# Patient Record
Sex: MALE | Race: WHITE | NOT HISPANIC OR LATINO | Employment: UNEMPLOYED | URBAN - METROPOLITAN AREA
[De-identification: names, ages, dates, MRNs, and addresses within clinical notes are randomized per-mention and may not be internally consistent; named-entity substitution may affect disease eponyms.]

---

## 2022-08-13 ENCOUNTER — HOSPITAL ENCOUNTER (INPATIENT)
Facility: HOSPITAL | Age: 57
LOS: 4 days | Discharge: HOME | End: 2022-08-17
Attending: INTERNAL MEDICINE | Admitting: INTERNAL MEDICINE

## 2022-08-13 DIAGNOSIS — R91.8 LUNG MASS: ICD-10-CM

## 2022-08-13 DIAGNOSIS — N28.89 RENAL MASS: Primary | ICD-10-CM

## 2022-08-13 PROBLEM — F12.90 MARIJUANA USE: Status: ACTIVE | Noted: 2022-08-13

## 2022-08-13 PROBLEM — F17.200 TOBACCO DEPENDENCE: Status: ACTIVE | Noted: 2022-08-13

## 2022-08-13 PROBLEM — I10 HIGH BLOOD PRESSURE: Status: ACTIVE | Noted: 2022-08-13

## 2022-08-13 PROBLEM — I87.1 SVC SYNDROME: Status: ACTIVE | Noted: 2022-08-13

## 2022-08-13 PROBLEM — Z78.9 ALCOHOL USE: Status: ACTIVE | Noted: 2022-08-13

## 2022-08-13 LAB
ALBUMIN SERPL-MCNC: 3.6 G/DL (ref 3.5–5.2)
ALBUMIN/GLOB SERPL: 0.9 G/DL
ALP SERPL-CCNC: 73 U/L (ref 39–117)
ALT SERPL W P-5'-P-CCNC: 6 U/L (ref 1–41)
ANION GAP SERPL CALCULATED.3IONS-SCNC: 9 MMOL/L (ref 5–15)
AST SERPL-CCNC: 9 U/L (ref 1–40)
BASOPHILS # BLD AUTO: 0.02 10*3/MM3 (ref 0–0.2)
BASOPHILS NFR BLD AUTO: 0.3 % (ref 0–1.5)
BILIRUB SERPL-MCNC: 0.3 MG/DL (ref 0–1.2)
BUN SERPL-MCNC: 5 MG/DL (ref 6–20)
BUN/CREAT SERPL: 8.5 (ref 7–25)
CALCIUM SPEC-SCNC: 9.9 MG/DL (ref 8.6–10.5)
CHLORIDE SERPL-SCNC: 104 MMOL/L (ref 98–107)
CO2 SERPL-SCNC: 26 MMOL/L (ref 22–29)
CREAT SERPL-MCNC: 0.59 MG/DL (ref 0.76–1.27)
D-LACTATE SERPL-SCNC: 0.9 MMOL/L (ref 0.5–2)
DEPRECATED RDW RBC AUTO: 43.2 FL (ref 37–54)
EGFRCR SERPLBLD CKD-EPI 2021: 113.2 ML/MIN/1.73
EOSINOPHIL # BLD AUTO: 0.08 10*3/MM3 (ref 0–0.4)
EOSINOPHIL NFR BLD AUTO: 1.3 % (ref 0.3–6.2)
ERYTHROCYTE [DISTWIDTH] IN BLOOD BY AUTOMATED COUNT: 12.1 % (ref 12.3–15.4)
GLOBULIN UR ELPH-MCNC: 3.9 GM/DL
GLUCOSE SERPL-MCNC: 91 MG/DL (ref 65–99)
HCT VFR BLD AUTO: 40.4 % (ref 37.5–51)
HGB BLD-MCNC: 13.5 G/DL (ref 13–17.7)
IMM GRANULOCYTES # BLD AUTO: 0.01 10*3/MM3 (ref 0–0.05)
IMM GRANULOCYTES NFR BLD AUTO: 0.2 % (ref 0–0.5)
INR PPP: 1 (ref 0.84–1.13)
LYMPHOCYTES # BLD AUTO: 1.53 10*3/MM3 (ref 0.7–3.1)
LYMPHOCYTES NFR BLD AUTO: 24.6 % (ref 19.6–45.3)
MCH RBC QN AUTO: 32.4 PG (ref 26.6–33)
MCHC RBC AUTO-ENTMCNC: 33.4 G/DL (ref 31.5–35.7)
MCV RBC AUTO: 96.9 FL (ref 79–97)
MONOCYTES # BLD AUTO: 0.65 10*3/MM3 (ref 0.1–0.9)
MONOCYTES NFR BLD AUTO: 10.4 % (ref 5–12)
NEUTROPHILS NFR BLD AUTO: 3.94 10*3/MM3 (ref 1.7–7)
NEUTROPHILS NFR BLD AUTO: 63.2 % (ref 42.7–76)
NRBC BLD AUTO-RTO: 0 /100 WBC (ref 0–0.2)
PLATELET # BLD AUTO: 178 10*3/MM3 (ref 140–450)
PMV BLD AUTO: 10.3 FL (ref 6–12)
POTASSIUM SERPL-SCNC: 3.8 MMOL/L (ref 3.5–5.2)
PROT SERPL-MCNC: 7.5 G/DL (ref 6–8.5)
PROTHROMBIN TIME: 13.1 SECONDS (ref 11.4–14.4)
RBC # BLD AUTO: 4.17 10*6/MM3 (ref 4.14–5.8)
SODIUM SERPL-SCNC: 139 MMOL/L (ref 136–145)
WBC NRBC COR # BLD: 6.23 10*3/MM3 (ref 3.4–10.8)

## 2022-08-13 PROCEDURE — 85610 PROTHROMBIN TIME: CPT | Performed by: INTERNAL MEDICINE

## 2022-08-13 PROCEDURE — 99223 1ST HOSP IP/OBS HIGH 75: CPT | Performed by: INTERNAL MEDICINE

## 2022-08-13 PROCEDURE — 83605 ASSAY OF LACTIC ACID: CPT | Performed by: INTERNAL MEDICINE

## 2022-08-13 PROCEDURE — 84145 PROCALCITONIN (PCT): CPT | Performed by: INTERNAL MEDICINE

## 2022-08-13 PROCEDURE — 0202U NFCT DS 22 TRGT SARS-COV-2: CPT | Performed by: INTERNAL MEDICINE

## 2022-08-13 PROCEDURE — 86901 BLOOD TYPING SEROLOGIC RH(D): CPT | Performed by: INTERNAL MEDICINE

## 2022-08-13 PROCEDURE — 80053 COMPREHEN METABOLIC PANEL: CPT | Performed by: INTERNAL MEDICINE

## 2022-08-13 PROCEDURE — 86850 RBC ANTIBODY SCREEN: CPT | Performed by: INTERNAL MEDICINE

## 2022-08-13 PROCEDURE — 87040 BLOOD CULTURE FOR BACTERIA: CPT | Performed by: INTERNAL MEDICINE

## 2022-08-13 PROCEDURE — 85025 COMPLETE CBC W/AUTO DIFF WBC: CPT | Performed by: INTERNAL MEDICINE

## 2022-08-13 PROCEDURE — 86900 BLOOD TYPING SEROLOGIC ABO: CPT | Performed by: INTERNAL MEDICINE

## 2022-08-13 RX ORDER — ACETAMINOPHEN 325 MG/1
650 TABLET ORAL EVERY 4 HOURS PRN
Status: DISCONTINUED | OUTPATIENT
Start: 2022-08-13 | End: 2022-08-17 | Stop reason: HOSPADM

## 2022-08-13 RX ORDER — DIAZEPAM 5 MG/ML
5 INJECTION, SOLUTION INTRAMUSCULAR; INTRAVENOUS
Status: DISCONTINUED | OUTPATIENT
Start: 2022-08-13 | End: 2022-08-17 | Stop reason: HOSPADM

## 2022-08-13 RX ORDER — SODIUM CHLORIDE 0.9 % (FLUSH) 0.9 %
10 SYRINGE (ML) INJECTION EVERY 12 HOURS SCHEDULED
Status: DISCONTINUED | OUTPATIENT
Start: 2022-08-13 | End: 2022-08-17 | Stop reason: HOSPADM

## 2022-08-13 RX ORDER — LORAZEPAM 0.5 MG/1
0.5 TABLET ORAL ONCE
Status: COMPLETED | OUTPATIENT
Start: 2022-08-14 | End: 2022-08-14

## 2022-08-13 RX ORDER — HEPARIN SODIUM 1000 [USP'U]/ML
25 INJECTION, SOLUTION INTRAVENOUS; SUBCUTANEOUS AS NEEDED
Status: DISCONTINUED | OUTPATIENT
Start: 2022-08-13 | End: 2022-08-13 | Stop reason: SDUPTHER

## 2022-08-13 RX ORDER — ONDANSETRON 2 MG/ML
4 INJECTION INTRAMUSCULAR; INTRAVENOUS EVERY 6 HOURS PRN
Status: DISCONTINUED | OUTPATIENT
Start: 2022-08-13 | End: 2022-08-17 | Stop reason: HOSPADM

## 2022-08-13 RX ORDER — HEPARIN SODIUM 1000 [USP'U]/ML
50 INJECTION, SOLUTION INTRAVENOUS; SUBCUTANEOUS AS NEEDED
Status: DISCONTINUED | OUTPATIENT
Start: 2022-08-13 | End: 2022-08-13 | Stop reason: SDUPTHER

## 2022-08-13 RX ORDER — NICOTINE 21 MG/24HR
1 PATCH, TRANSDERMAL 24 HOURS TRANSDERMAL EVERY 24 HOURS
Status: DISCONTINUED | OUTPATIENT
Start: 2022-08-13 | End: 2022-08-17 | Stop reason: HOSPADM

## 2022-08-13 RX ORDER — DIAZEPAM 5 MG/ML
10 INJECTION, SOLUTION INTRAMUSCULAR; INTRAVENOUS
Status: DISCONTINUED | OUTPATIENT
Start: 2022-08-13 | End: 2022-08-17 | Stop reason: HOSPADM

## 2022-08-13 RX ORDER — FOLIC ACID 1 MG/1
1 TABLET ORAL DAILY
Status: COMPLETED | OUTPATIENT
Start: 2022-08-14 | End: 2022-08-16

## 2022-08-13 RX ORDER — ACETAMINOPHEN 650 MG/1
650 SUPPOSITORY RECTAL EVERY 4 HOURS PRN
Status: DISCONTINUED | OUTPATIENT
Start: 2022-08-13 | End: 2022-08-17 | Stop reason: HOSPADM

## 2022-08-13 RX ORDER — PANTOPRAZOLE SODIUM 40 MG/1
40 TABLET, DELAYED RELEASE ORAL
Status: DISCONTINUED | OUTPATIENT
Start: 2022-08-14 | End: 2022-08-17 | Stop reason: HOSPADM

## 2022-08-13 RX ORDER — LORAZEPAM 0.5 MG/1
0.5 TABLET ORAL EVERY 6 HOURS PRN
Status: DISCONTINUED | OUTPATIENT
Start: 2022-08-13 | End: 2022-08-13 | Stop reason: SDUPTHER

## 2022-08-13 RX ORDER — SODIUM CHLORIDE 0.9 % (FLUSH) 0.9 %
10 SYRINGE (ML) INJECTION AS NEEDED
Status: DISCONTINUED | OUTPATIENT
Start: 2022-08-13 | End: 2022-08-17 | Stop reason: HOSPADM

## 2022-08-13 RX ORDER — MORPHINE SULFATE 2 MG/ML
2 INJECTION, SOLUTION INTRAMUSCULAR; INTRAVENOUS EVERY 4 HOURS PRN
Status: DISCONTINUED | OUTPATIENT
Start: 2022-08-13 | End: 2022-08-17 | Stop reason: HOSPADM

## 2022-08-13 RX ORDER — LORAZEPAM 1 MG/1
1 TABLET ORAL
Status: DISCONTINUED | OUTPATIENT
Start: 2022-08-13 | End: 2022-08-17 | Stop reason: HOSPADM

## 2022-08-13 RX ORDER — ACETAMINOPHEN 160 MG/5ML
650 SOLUTION ORAL EVERY 4 HOURS PRN
Status: DISCONTINUED | OUTPATIENT
Start: 2022-08-13 | End: 2022-08-17 | Stop reason: HOSPADM

## 2022-08-13 RX ORDER — HEPARIN SODIUM 10000 [USP'U]/100ML
28 INJECTION, SOLUTION INTRAVENOUS
Status: DISCONTINUED | OUTPATIENT
Start: 2022-08-14 | End: 2022-08-17

## 2022-08-13 RX ORDER — DIPHENOXYLATE HYDROCHLORIDE AND ATROPINE SULFATE 2.5; .025 MG/1; MG/1
1 TABLET ORAL DAILY
Status: COMPLETED | OUTPATIENT
Start: 2022-08-14 | End: 2022-08-16

## 2022-08-13 RX ORDER — THIAMINE HYDROCHLORIDE 100 MG/ML
100 INJECTION, SOLUTION INTRAMUSCULAR; INTRAVENOUS ONCE
Status: COMPLETED | OUTPATIENT
Start: 2022-08-14 | End: 2022-08-14

## 2022-08-13 RX ORDER — LORAZEPAM 1 MG/1
2 TABLET ORAL
Status: DISCONTINUED | OUTPATIENT
Start: 2022-08-13 | End: 2022-08-17 | Stop reason: HOSPADM

## 2022-08-13 RX ADMIN — Medication 10 ML: at 23:31

## 2022-08-13 RX ADMIN — HEPARIN SODIUM 18 UNITS/KG/HR: 10000 INJECTION, SOLUTION INTRAVENOUS at 23:30

## 2022-08-13 RX ADMIN — Medication 1 PATCH: at 23:17

## 2022-08-14 ENCOUNTER — APPOINTMENT (OUTPATIENT)
Dept: OTHER | Facility: HOSPITAL | Age: 57
End: 2022-08-14

## 2022-08-14 PROBLEM — I82.890 JUGULAR VEIN THROMBOSIS: Status: ACTIVE | Noted: 2022-08-14

## 2022-08-14 LAB
ABO GROUP BLD: NORMAL
ABO GROUP BLD: NORMAL
ANION GAP SERPL CALCULATED.3IONS-SCNC: 10 MMOL/L (ref 5–15)
APTT PPP: 37.5 SECONDS (ref 60–90)
B PARAPERT DNA SPEC QL NAA+PROBE: NOT DETECTED
B PERT DNA SPEC QL NAA+PROBE: NOT DETECTED
BASOPHILS # BLD AUTO: 0.03 10*3/MM3 (ref 0–0.2)
BASOPHILS NFR BLD AUTO: 0.5 % (ref 0–1.5)
BLD GP AB SCN SERPL QL: NEGATIVE
BUN SERPL-MCNC: 7 MG/DL (ref 6–20)
BUN/CREAT SERPL: 12.1 (ref 7–25)
C PNEUM DNA NPH QL NAA+NON-PROBE: NOT DETECTED
CALCIUM SPEC-SCNC: 9.5 MG/DL (ref 8.6–10.5)
CHLORIDE SERPL-SCNC: 104 MMOL/L (ref 98–107)
CO2 SERPL-SCNC: 23 MMOL/L (ref 22–29)
CREAT SERPL-MCNC: 0.58 MG/DL (ref 0.76–1.27)
DEPRECATED RDW RBC AUTO: 43 FL (ref 37–54)
EGFRCR SERPLBLD CKD-EPI 2021: 113.8 ML/MIN/1.73
EOSINOPHIL # BLD AUTO: 0.1 10*3/MM3 (ref 0–0.4)
EOSINOPHIL NFR BLD AUTO: 1.6 % (ref 0.3–6.2)
ERYTHROCYTE [DISTWIDTH] IN BLOOD BY AUTOMATED COUNT: 12 % (ref 12.3–15.4)
FLUAV SUBTYP SPEC NAA+PROBE: NOT DETECTED
FLUBV RNA ISLT QL NAA+PROBE: NOT DETECTED
GLUCOSE SERPL-MCNC: 98 MG/DL (ref 65–99)
HADV DNA SPEC NAA+PROBE: NOT DETECTED
HCOV 229E RNA SPEC QL NAA+PROBE: NOT DETECTED
HCOV HKU1 RNA SPEC QL NAA+PROBE: NOT DETECTED
HCOV NL63 RNA SPEC QL NAA+PROBE: NOT DETECTED
HCOV OC43 RNA SPEC QL NAA+PROBE: NOT DETECTED
HCT VFR BLD AUTO: 39.6 % (ref 37.5–51)
HGB BLD-MCNC: 13.3 G/DL (ref 13–17.7)
HMPV RNA NPH QL NAA+NON-PROBE: NOT DETECTED
HPIV1 RNA ISLT QL NAA+PROBE: NOT DETECTED
HPIV2 RNA SPEC QL NAA+PROBE: NOT DETECTED
HPIV3 RNA NPH QL NAA+PROBE: NOT DETECTED
HPIV4 P GENE NPH QL NAA+PROBE: NOT DETECTED
IMM GRANULOCYTES # BLD AUTO: 0.02 10*3/MM3 (ref 0–0.05)
IMM GRANULOCYTES NFR BLD AUTO: 0.3 % (ref 0–0.5)
LYMPHOCYTES # BLD AUTO: 1.39 10*3/MM3 (ref 0.7–3.1)
LYMPHOCYTES NFR BLD AUTO: 22.1 % (ref 19.6–45.3)
M PNEUMO IGG SER IA-ACNC: NOT DETECTED
MAGNESIUM SERPL-MCNC: 2.1 MG/DL (ref 1.6–2.6)
MCH RBC QN AUTO: 32.7 PG (ref 26.6–33)
MCHC RBC AUTO-ENTMCNC: 33.6 G/DL (ref 31.5–35.7)
MCV RBC AUTO: 97.3 FL (ref 79–97)
MONOCYTES # BLD AUTO: 0.64 10*3/MM3 (ref 0.1–0.9)
MONOCYTES NFR BLD AUTO: 10.2 % (ref 5–12)
NEUTROPHILS NFR BLD AUTO: 4.11 10*3/MM3 (ref 1.7–7)
NEUTROPHILS NFR BLD AUTO: 65.3 % (ref 42.7–76)
NRBC BLD AUTO-RTO: 0 /100 WBC (ref 0–0.2)
PLATELET # BLD AUTO: 183 10*3/MM3 (ref 140–450)
PMV BLD AUTO: 10.4 FL (ref 6–12)
POTASSIUM SERPL-SCNC: 3.8 MMOL/L (ref 3.5–5.2)
PROCALCITONIN SERPL-MCNC: 0.04 NG/ML (ref 0–0.25)
RBC # BLD AUTO: 4.07 10*6/MM3 (ref 4.14–5.8)
RH BLD: NEGATIVE
RH BLD: NEGATIVE
RHINOVIRUS RNA SPEC NAA+PROBE: NOT DETECTED
RSV RNA NPH QL NAA+NON-PROBE: NOT DETECTED
SARS-COV-2 RNA NPH QL NAA+NON-PROBE: NOT DETECTED
SODIUM SERPL-SCNC: 137 MMOL/L (ref 136–145)
T&S EXPIRATION DATE: NORMAL
UFH PPP CHRO-ACNC: 0.1 IU/ML (ref 0.3–0.7)
UFH PPP CHRO-ACNC: 0.1 IU/ML (ref 0.3–0.7)
WBC NRBC COR # BLD: 6.29 10*3/MM3 (ref 3.4–10.8)

## 2022-08-14 PROCEDURE — 83735 ASSAY OF MAGNESIUM: CPT | Performed by: INTERNAL MEDICINE

## 2022-08-14 PROCEDURE — 85520 HEPARIN ASSAY: CPT

## 2022-08-14 PROCEDURE — 86900 BLOOD TYPING SEROLOGIC ABO: CPT

## 2022-08-14 PROCEDURE — 85025 COMPLETE CBC W/AUTO DIFF WBC: CPT | Performed by: INTERNAL MEDICINE

## 2022-08-14 PROCEDURE — 99232 SBSQ HOSP IP/OBS MODERATE 35: CPT | Performed by: FAMILY MEDICINE

## 2022-08-14 PROCEDURE — 85730 THROMBOPLASTIN TIME PARTIAL: CPT

## 2022-08-14 PROCEDURE — 86901 BLOOD TYPING SEROLOGIC RH(D): CPT

## 2022-08-14 PROCEDURE — 25010000002 MORPHINE PER 10 MG: Performed by: INTERNAL MEDICINE

## 2022-08-14 PROCEDURE — 25010000002 HEPARIN (PORCINE) PER 1000 UNITS

## 2022-08-14 PROCEDURE — 99222 1ST HOSP IP/OBS MODERATE 55: CPT | Performed by: INTERNAL MEDICINE

## 2022-08-14 PROCEDURE — 25010000002 HEPARIN (PORCINE) 25000-0.45 UT/250ML-% SOLUTION

## 2022-08-14 PROCEDURE — 80048 BASIC METABOLIC PNL TOTAL CA: CPT | Performed by: INTERNAL MEDICINE

## 2022-08-14 PROCEDURE — 25010000002 HEPARIN (PORCINE) 25000-0.45 UT/250ML-% SOLUTION: Performed by: INTERNAL MEDICINE

## 2022-08-14 RX ORDER — HEPARIN SODIUM 1000 [USP'U]/ML
3800 INJECTION, SOLUTION INTRAVENOUS; SUBCUTANEOUS ONCE
Status: COMPLETED | OUTPATIENT
Start: 2022-08-14 | End: 2022-08-14

## 2022-08-14 RX ORDER — SODIUM CHLORIDE 0.9 % (FLUSH) 0.9 %
10 SYRINGE (ML) INJECTION EVERY 12 HOURS SCHEDULED
Status: DISCONTINUED | OUTPATIENT
Start: 2022-08-14 | End: 2022-08-14

## 2022-08-14 RX ORDER — CHOLECALCIFEROL (VITAMIN D3) 125 MCG
5 CAPSULE ORAL NIGHTLY PRN
Status: DISCONTINUED | OUTPATIENT
Start: 2022-08-14 | End: 2022-08-17 | Stop reason: HOSPADM

## 2022-08-14 RX ORDER — SODIUM CHLORIDE 0.9 % (FLUSH) 0.9 %
10 SYRINGE (ML) INJECTION AS NEEDED
Status: DISCONTINUED | OUTPATIENT
Start: 2022-08-14 | End: 2022-08-14

## 2022-08-14 RX ORDER — POLYETHYLENE GLYCOL 3350 17 G/17G
17 POWDER, FOR SOLUTION ORAL DAILY PRN
Status: DISCONTINUED | OUTPATIENT
Start: 2022-08-14 | End: 2022-08-17 | Stop reason: HOSPADM

## 2022-08-14 RX ORDER — BISACODYL 10 MG
10 SUPPOSITORY, RECTAL RECTAL DAILY PRN
Status: DISCONTINUED | OUTPATIENT
Start: 2022-08-14 | End: 2022-08-17 | Stop reason: HOSPADM

## 2022-08-14 RX ORDER — AMOXICILLIN 250 MG
2 CAPSULE ORAL 2 TIMES DAILY
Status: DISCONTINUED | OUTPATIENT
Start: 2022-08-14 | End: 2022-08-17 | Stop reason: HOSPADM

## 2022-08-14 RX ORDER — HEPARIN SODIUM 1000 [USP'U]/ML
3900 INJECTION, SOLUTION INTRAVENOUS; SUBCUTANEOUS ONCE
Status: COMPLETED | OUTPATIENT
Start: 2022-08-14 | End: 2022-08-14

## 2022-08-14 RX ORDER — BISACODYL 5 MG/1
5 TABLET, DELAYED RELEASE ORAL DAILY PRN
Status: DISCONTINUED | OUTPATIENT
Start: 2022-08-14 | End: 2022-08-17 | Stop reason: HOSPADM

## 2022-08-14 RX ADMIN — HEPARIN SODIUM 22 UNITS/KG/HR: 10000 INJECTION, SOLUTION INTRAVENOUS at 17:34

## 2022-08-14 RX ADMIN — Medication 1 PATCH: at 23:29

## 2022-08-14 RX ADMIN — MORPHINE SULFATE 2 MG: 2 INJECTION, SOLUTION INTRAMUSCULAR; INTRAVENOUS at 00:13

## 2022-08-14 RX ADMIN — PANTOPRAZOLE SODIUM 40 MG: 40 TABLET, DELAYED RELEASE ORAL at 05:09

## 2022-08-14 RX ADMIN — HEPARIN SODIUM 3800 UNITS: 1000 INJECTION, SOLUTION INTRAVENOUS; SUBCUTANEOUS at 08:11

## 2022-08-14 RX ADMIN — MORPHINE SULFATE 2 MG: 2 INJECTION, SOLUTION INTRAMUSCULAR; INTRAVENOUS at 11:08

## 2022-08-14 RX ADMIN — LORAZEPAM 0.5 MG: 0.5 TABLET ORAL at 00:13

## 2022-08-14 RX ADMIN — PANTOPRAZOLE SODIUM 40 MG: 40 TABLET, DELAYED RELEASE ORAL at 00:17

## 2022-08-14 RX ADMIN — MORPHINE SULFATE 2 MG: 2 INJECTION, SOLUTION INTRAMUSCULAR; INTRAVENOUS at 23:27

## 2022-08-14 RX ADMIN — THIAMINE HCL TAB 100 MG 100 MG: 100 TAB at 00:12

## 2022-08-14 RX ADMIN — MORPHINE SULFATE 2 MG: 2 INJECTION, SOLUTION INTRAMUSCULAR; INTRAVENOUS at 05:13

## 2022-08-14 RX ADMIN — MORPHINE SULFATE 2 MG: 2 INJECTION, SOLUTION INTRAMUSCULAR; INTRAVENOUS at 15:15

## 2022-08-14 RX ADMIN — FOLIC ACID 1 MG: 1 TABLET ORAL at 00:13

## 2022-08-14 RX ADMIN — MORPHINE SULFATE 2 MG: 2 INJECTION, SOLUTION INTRAMUSCULAR; INTRAVENOUS at 19:39

## 2022-08-14 RX ADMIN — SENNOSIDES AND DOCUSATE SODIUM 2 TABLET: 50; 8.6 TABLET ORAL at 20:47

## 2022-08-14 RX ADMIN — HEPARIN SODIUM 3900 UNITS: 1000 INJECTION, SOLUTION INTRAVENOUS; SUBCUTANEOUS at 19:12

## 2022-08-14 RX ADMIN — MULTIVITAMIN TABLET 1 TABLET: TABLET at 00:12

## 2022-08-14 NOTE — PLAN OF CARE
Problem: Adult Inpatient Plan of Care  Goal: Plan of Care Review  Outcome: Ongoing, Progressing  Flowsheets (Taken 8/14/2022 0235)  Plan of Care Reviewed With: patient  Goal: Patient-Specific Goal (Individualized)  Outcome: Ongoing, Progressing  Goal: Absence of Hospital-Acquired Illness or Injury  Outcome: Ongoing, Progressing  Intervention: Identify and Manage Fall Risk  Recent Flowsheet Documentation  Taken 8/14/2022 0200 by Terra Elam RN  Safety Promotion/Fall Prevention: safety round/check completed  Taken 8/13/2022 2230 by Terra Elam RN  Safety Promotion/Fall Prevention:   activity supervised   assistive device/personal items within reach   clutter free environment maintained   fall prevention program maintained   nonskid shoes/slippers when out of bed   room organization consistent  Intervention: Prevent Skin Injury  Recent Flowsheet Documentation  Taken 8/14/2022 0200 by Terra Elam RN  Skin Protection: adhesive use limited  Taken 8/13/2022 2230 by Terra Elam RN  Body Position: position changed independently  Skin Protection: adhesive use limited  Intervention: Prevent and Manage VTE (Venous Thromboembolism) Risk  Recent Flowsheet Documentation  Taken 8/13/2022 2230 by Terra Elam RN  Activity Management: activity adjusted per tolerance  VTE Prevention/Management: sequential compression devices off  Intervention: Prevent Infection  Recent Flowsheet Documentation  Taken 8/14/2022 0200 by Terra Elam RN  Infection Prevention:   environmental surveillance performed   rest/sleep promoted  Taken 8/13/2022 2230 by Terra Elam RN  Infection Prevention:   environmental surveillance performed   hand hygiene promoted  Goal: Optimal Comfort and Wellbeing  Outcome: Ongoing, Progressing  Intervention: Monitor Pain and Promote Comfort  Recent Flowsheet Documentation  Taken 8/13/2022 2230 by Terra Elam RN  Pain Management Interventions: see MAR  Goal: Readiness for  Transition of Care  Outcome: Ongoing, Progressing  Intervention: Mutually Develop Transition Plan  Recent Flowsheet Documentation  Taken 8/13/2022 2200 by Terra Elam, RN  Equipment Currently Used at Home: none  Transportation Anticipated: family or friend will provide  Patient/Family Anticipated Services at Transition:   Patient/Family Anticipates Transition to: home   Goal Outcome Evaluation:  Plan of Care Reviewed With: patient
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
## 2022-08-14 NOTE — NURSING NOTE
ACC REVIEW REPORT: [facility]        PATIENT NAME: Mitesh Molina    PATIENT ID: [id number]        COVID-19 ACC SCREENING       DOES THE PATIENT HAVE A FEVER GREATER THAN OR EQUAL .4: N    IS THE PATIENT EXPERIENCING SHORTNESS OF BREATH: Y    DOES THE PATIENT HAVE A COUGH: Y  DOES THE PATIENT HAVE ANY OF THE FOLLOWING RISK FACTORS:    EXPOSURE TO SUSPECTED OR KNOWN COVID-19: N    RECENT TRAVEL HISTORY TO ENDEMIC AREA (DOMESTIC/LOCAL): N    IS THE PATIENT A HEALTHCARE WORKER: N    HAS THE PATIENT EXPERIENCED A LOSS OF SENSE OF TASTE OR SMELL: N    HAS THE PATIENT BEEN TESTED FOR COVID-19: Y    DATE TESTED: 08/13/2022 NEGATIVE    LAB TESTING SENT TO:           BED: 546    BED TYPE: TELE    BED GIVEN TO: LATRICE RUSSO RN    TIME BED GIVEN: 2017    TODAY'S DATE: 8/13/2022    TRANSFER DATE: 8/13/22    ETA: UNKNOWN, PENDING TRANSFER MODE. TO UPDATE AT LATER TIME    TRANSFERRING FACILITY: [facility]    TRANSFERRING FACILITY PHONE # : [phone]    TRANSFERRING MD: STACIE JON NP    DATE/TIME REQUEST RECEIVED: 8/13/22 1923    [facility] RN: LESTER BARRIOS RN    REPORT FROM: LATRICE RUSSO RN    TIME REPORT TAKEN: 2020    DIAGNOSIS: PULMONARY MASS, SUPERIOR VENA CAVA SYNDROME    REASON FOR TRANSFER TO [facility]: HIGHER LEVEL OF CARE    TRANSPORTATION: UNKNOWN AT THIS TIME    CLINICAL REASON FOR TRANSFER TO [facility]: HIGHER LEVEL OF CARE      CLINICAL INFORMATION    HEIGHT: UNKNOWN    WEIGHT: 79.4 KG    ALLERGIES: CODEINE    INFECTIOUS DISEASE: NO    ISOLATION: NO    VITAL SIGNS:   TIME: 1900  TEMP: 98.3  PULSE: 102  B/P: 158/91  RESP: 18        LAB INFORMATION: UNREMARKABLE    CULTURE INFORMATION: COVID AND FLU NEGATIVE    MEDS/IV FLUIDS: 20G RAC HEPARIN GTT AT 14.3ML/HR AFTER 6,400U BOLUS AT 1940.      CARDIAC SYSTEM:    CHEST PAIN: N    RATE:     SCALE:     RHYTHM: ST    Is patient taking or has patient been given any drugs that could increase bleeding? Y    DRUG: HEPARIN     DOSE/FREQUENCY: 6,400U ONCE AT  1940 FOLLOWED BY GTT AT 14.3ML/HR    TROPONIN:    DATE:   TIME:   TROP:     DATE:   TIME:   TROP:       HEART CATH:     HEART CATH DATE:     HEART CATH RESULTS:     LAD:   RCA:   CX:   LMAIN:   EF:     SWAN:     SITE:   SIZE:    DATE INSERTED:     ARTLINE:     SITE:   SIZE:   DATE INSERTED:     SHEATH:    SITE:   SIZE:   DATE INSERTED:       VASOSEAL:    SITE:   DATE INSERTED:       EXTERNAL PACEMAKER:     RATE:   EXT PACER ON:       MODE:    DATE INSERTED:   OUTPUT SETTING:   SENSITIVITY SETTING:   SENSITIVITY TYPE:       IABP:    SITE:   AUG PRESSURE:   DATE INSERTED:     CARDIAC NOTES:       RESPIRATORY SYSTEM:    LUNG SOUNDS: RHONCHI    ABG DATE:         ABG TIME:     ABG RESULTS:    PH:   PCO2:   PO2:   HCO3:   O2 SAT:       ETT SIZE:     ORAL:     NASAL:     SECURED AT MEASUREMENT (CM):     ON VENTILATOR:    TV:   FI02:   RATE:   PEEP:     OXYGEN: ROOM AIR    O2 SAT: 98%    IMAGING FINDINGS: CT RIGHT UPPER LOBE LUNG MASS WITH MULTIPLE METS TO B LUNGS.  EXTENSIVE MEDIASTINAL LYMPH NODE INVOLVEMENT CAUSING PRESSURE CAUSING SUPERIOR VENA CAVA SYNDROME  RENAL MALIGNANCY DETECTED    PNEUMO CHEST TUBE SEAL TYPE:     RESPIRATORY STATUS: GOOD      CNS/MUSCULOSKELETAL    ALERT AND ORIENTED:    PERSON: Y  PLACE: Y  TIME: Y    INJURY:  WHERE: BRUISING TO NECK AND BILATERAL SHOULDERS CAUSING PAIN, NO TRAUMATIC EVENT    SHANELLE COMA SCALE:    E:   M:   V:     STROKE SCALE:     SIZE OF HEMORRHAGE:     SYMPTOMS: (CHOOSE APPROPRIATE)    ASPHASIA:   ATAXIA:   DYSARTHRIA:   DYSPHASIA:   HEADACHE:   PARALYSIS:   SEIZURE:   SYNCOPE:   VERTIGO:   VISION CHANGE:        EXTREMITY WEAKNESS:    LEFT ARM:   RIGHT ARM:   LEFT LEG:   RIGHT LEG:     CAT SCAN RESULTS:     MRI RESULTS:     CNS/MUSCULOSKELETAL NOTES: MOVES ALL EXTREMITIES WELL. AMBULATES WITHOUT DIFFICULTY      GI//GY      ABDOMINAL PAIN:     VOMITING:     DIARRHEA:     NAUSEA:     BOWEL SOUNDS:     OCCULT STOOL:     HEMATURIA:     NG TUBE:    SIZE:     DATE INSERTED:        ULTRASOUND RESULTS:     ACUTE ABDOMEN RESULTS:     CT SCAN RESULTS:       GI//GY NOTES:     HERMINIA:     PAST MEDICAL HISTORY: SMOKER SINCE AGE 14, DAILY ETOH USE. AMOUNT UNKNOWN, OCCASIONAL MARIJUANA USE    PATIENT WENT TO ED TODAY AFTER HAVING A SOAR THROAT FOR OVER 1 WEEK. WENT TO MD AND WAS GIVEN STEROIDS. 3 DAYS AGO COUGH DEVELOPED AND BRUISING TO CHEST AND NECK OCCURRED. PAIN TO SHOULDERS AND NECK BROUGHT HIM TO THE ED TODAY.     OTHER SYMPTOM NOTES: CLEAR WITH PRODUCTIVE COUGH    ADDITIONAL NOTES: GIVEN 4MG MORPHINE FOR PAIN AND A NICOTINE PATCH IS IN PLACE          Kadie Judge  8/13/2022  20:27 EDT

## 2022-08-14 NOTE — PROGRESS NOTES
[facility] Medicine Services  PROGRESS NOTE    Patient Name: Mitesh Molina  : 1965  MRN: [id number]    Date of Admission: 2022  Primary Care Physician: Christophe Garcia APRN    Subjective   Subjective     CC:  F/u SVC syndrome     HPI:  Pt seen resting in bed. States he is tired. He denies any pain currently     ROS:  Gen- No fevers, chills  CV- No chest pain, palpitations  Resp- No cough, dyspnea  GI- No N/V/D, abd pain        Objective   Objective     Vital Signs:   Temp:  [98.5 °F (36.9 °C)-98.6 °F (37 °C)] 98.5 °F (36.9 °C)  Heart Rate:  [] 103  Resp:  [17-18] 18  BP: (138-160)/(82-99) 149/99     Physical Exam:  Constitutional: No acute distress, older than stated age   HENT: NCAT, mucous membranes moist  Respiratory: Clear to auscultation bilaterally, respiratory effort normal   Cardiovascular: RRR, no murmurs, rubs, or gallops  Gastrointestinal: Positive bowel sounds, soft, nontender, nondistended  Musculoskeletal: No bilateral ankle edema  Psychiatric: Appropriate affect, cooperative  Neurologic: Oriented x 3, speech clear  Skin: No rashes      Results Reviewed:  LAB RESULTS:      Lab 22  04522  2310   WBC 6.29 6.23   HEMOGLOBIN 13.3 13.5   HEMATOCRIT 39.6 40.4   PLATELETS 183 178   NEUTROS ABS 4.11 3.94   IMMATURE GRANS (ABS) 0.02 0.01   LYMPHS ABS 1.39 1.53   MONOS ABS 0.64 0.65   EOS ABS 0.10 0.08   MCV 97.3* 96.9   PROCALCITONIN  --  0.04   LACTATE  --  0.9   PROTIME  --  13.1   APTT 37.5*  --    HEPARIN ANTI-XA 0.10*  --          Lab 22  0457 22  2310   SODIUM 137 139   POTASSIUM 3.8 3.8   CHLORIDE 104 104   CO2 23.0 26.0   ANION GAP 10.0 9.0   BUN 7 5*   CREATININE 0.58* 0.59*   EGFR 113.8 113.2   GLUCOSE 98 91   CALCIUM 9.5 9.9   MAGNESIUM 2.1  --          Lab 22  2310   TOTAL PROTEIN 7.5   ALBUMIN 3.60   GLOBULIN 3.9   ALT (SGPT) 6   AST (SGOT) 9   BILIRUBIN 0.3   ALK PHOS 73         Lab 22  2310   PROTIME  13.1   INR 1.00             Lab 08/13/22  2310   ABO TYPING O   RH TYPING Negative   ANTIBODY SCREEN Negative         Brief Urine Lab Results     None          Microbiology Results Abnormal     Procedure Component Value - Date/Time    COVID PRE-OP / PRE-PROCEDURE SCREENING ORDER (NO ISOLATION) - Swab, Nasopharynx [[id number]]  (Normal) Collected: 08/13/22 2334    Lab Status: Final result Specimen: Swab from Nasopharynx Updated: 08/14/22 0032    Narrative:      The following orders were created for panel order COVID PRE-OP / PRE-PROCEDURE SCREENING ORDER (NO ISOLATION) - Swab, Nasopharynx.  Procedure                               Abnormality         Status                     ---------                               -----------         ------                     Respiratory Panel PCR w/...[[id number]]  Normal              Final result                 Please view results for these tests on the individual orders.    Respiratory Panel PCR w/COVID-19(SARS-CoV-2) KELY/EDI/TERI/PAD/COR/MAD/MONTY In-House, NP Swab in UTM/VTM, 3-4 HR TAT - Swab, Nasopharynx [[id number]]  (Normal) Collected: 08/13/22 2334    Lab Status: Final result Specimen: Swab from Nasopharynx Updated: 08/14/22 0032     ADENOVIRUS, PCR Not Detected     Coronavirus 229E Not Detected     Coronavirus HKU1 Not Detected     Coronavirus NL63 Not Detected     Coronavirus OC43 Not Detected     COVID19 Not Detected     Human Metapneumovirus Not Detected     Human Rhinovirus/Enterovirus Not Detected     Influenza A PCR Not Detected     Influenza B PCR Not Detected     Parainfluenza Virus 1 Not Detected     Parainfluenza Virus 2 Not Detected     Parainfluenza Virus 3 Not Detected     Parainfluenza Virus 4 Not Detected     RSV, PCR Not Detected     Bordetella pertussis pcr Not Detected     Bordetella parapertussis PCR Not Detected     Chlamydophila pneumoniae PCR Not Detected     Mycoplasma pneumo by PCR Not Detected    Narrative:      In the setting of a positive  respiratory panel with a viral infection PLUS a negative procalcitonin without other underlying concern for bacterial infection, consider observing off antibiotics or discontinuation of antibiotics and continue supportive care. If the respiratory panel is positive for atypical bacterial infection (Bordetella pertussis, Chlamydophila pneumoniae, or Mycoplasma pneumoniae), consider antibiotic de-escalation to target atypical bacterial infection.          CT Outside Films    Result Date: 8/14/2022  This procedure was auto-finalized with no dictation required.    CT Outside Films    Result Date: 8/14/2022  This procedure was auto-finalized with no dictation required.    CT Outside Films    Result Date: 8/14/2022  This procedure was auto-finalized with no dictation required.          I have reviewed the medications:  Scheduled Meds:thiamine, 100 mg, Oral, Daily   And  multivitamin, 1 tablet, Oral, Daily   And  folic acid, 1 mg, Oral, Daily  heparin (porcine), 3,800 Units, Intravenous, Once  nicotine, 1 patch, Transdermal, Q24H  pantoprazole, 40 mg, Oral, Q AM  senna-docusate sodium, 2 tablet, Oral, BID  sodium chloride, 10 mL, Intravenous, Q12H      Continuous Infusions:heparin, 22 Units/kg/hr, Last Rate: 18 Units/kg/hr (08/13/22 2330)  Pharmacy to Dose Heparin,       PRN Meds:.•  acetaminophen **OR** acetaminophen **OR** acetaminophen  •  senna-docusate sodium **AND** polyethylene glycol **AND** bisacodyl **AND** bisacodyl  •  LORazepam **OR** diazePAM **OR** LORazepam **OR** diazePAM **OR** diazePAM  •  melatonin  •  Morphine  •  ondansetron  •  Pharmacy to Dose Heparin  •  sodium chloride    Assessment & Plan   Assessment & Plan     Active Hospital Problems    Diagnosis  POA   • Jugular vein thrombosis [I82.890]  Unknown   • SVC syndrome [I87.1]  Yes   • Pulmonary mass [R91.8]  Yes   • Renal mass [N28.89]  Yes   • High blood pressure [I10]  Yes   • Marijuana use [F12.90]  Yes   • Tobacco dependence [F17.200]  Yes   •  Alcohol use [Z72.89]  Yes      Resolved Hospital Problems   No resolved problems to display.        Brief Hospital Course to date:  Mitesh Molina is a 57 y.o. male with PMH of HTN, tobacco/THC use, ETOH abuse transferred from [facility] for SVC syndrome. Imaging there showed right renal mass, RUL pulmonary mass, numerous pulm nodules. And mediastinal adenopathy. CT neck shoed thrombus in both jugular veins, left greater than right with superior vena cava occlusion.     SVC syndrome  Jugular vein thrombosis   - continue heparin drip  - IR consult for evaluation, possible stenting  - NPO after MN  - IV morphine PRN for pain control  - PPI for stress ulcer prophylaxis     Pulmonary / Renal Mass  - oncology consulted        Elevated Blood Pressure   - /93 on arrival  - not on BP medications at home     Tobacco / marijuana dependence  - nicotine patch       Alcohol use  - denies h/o withdrawal, drinking 6 pack of beer on average twice weekly  - CIWA protocol w/ PRN oral ativan/IV valium  - thiamine / folic acid / MVI  - seizure precautions         Expected Discharge Location and Transportation: Home   Expected Discharge Date: 8/17    DVT prophylaxis:  Medical and mechanical DVT prophylaxis orders are present.          CODE STATUS:   Code Status and Medical Interventions:   Ordered at: 08/14/22 0011     Code Status (Patient has no pulse and is not breathing):    CPR (Attempt to Resuscitate)     Medical Interventions (Patient has pulse or is breathing):    Full Support       Anabela Sandoval, DO  08/14/22
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
## 2022-08-14 NOTE — PLAN OF CARE
Problem: Adult Inpatient Plan of Care  Goal: Plan of Care Review  Outcome: Ongoing, Progressing  Goal: Patient-Specific Goal (Individualized)  Outcome: Ongoing, Progressing  Goal: Absence of Hospital-Acquired Illness or Injury  Outcome: Ongoing, Progressing  Intervention: Identify and Manage Fall Risk  Recent Flowsheet Documentation  Taken 8/14/2022 1600 by Shayla Heath RN  Safety Promotion/Fall Prevention:   assistive device/personal items within reach   activity supervised   clutter free environment maintained   fall prevention program maintained   nonskid shoes/slippers when out of bed   safety round/check completed   toileting scheduled  Taken 8/14/2022 1400 by Shayla Heath RN  Safety Promotion/Fall Prevention:   activity supervised   assistive device/personal items within reach   clutter free environment maintained   fall prevention program maintained   nonskid shoes/slippers when out of bed   safety round/check completed   toileting scheduled  Taken 8/14/2022 1200 by Shayla Heath RN  Safety Promotion/Fall Prevention:   activity supervised   assistive device/personal items within reach   clutter free environment maintained   fall prevention program maintained   nonskid shoes/slippers when out of bed   safety round/check completed   toileting scheduled  Taken 8/14/2022 1000 by Shayla Heath RN  Safety Promotion/Fall Prevention:   activity supervised   assistive device/personal items within reach   clutter free environment maintained   fall prevention program maintained   nonskid shoes/slippers when out of bed   safety round/check completed   toileting scheduled  Taken 8/14/2022 0800 by Shayla Heath RN  Safety Promotion/Fall Prevention:   activity supervised   assistive device/personal items within reach   clutter free environment maintained   fall prevention program maintained   nonskid shoes/slippers when out of bed   safety round/check completed   toileting scheduled  Intervention: Prevent Skin  Injury  Recent Flowsheet Documentation  Taken 8/14/2022 1600 by Shayla Heath RN  Body Position: position changed independently  Skin Protection:   adhesive use limited   incontinence pads utilized   tubing/devices free from skin contact  Taken 8/14/2022 1400 by Shayla Heath RN  Body Position: position changed independently  Skin Protection:   adhesive use limited   incontinence pads utilized   tubing/devices free from skin contact  Taken 8/14/2022 1200 by Shayla Heath RN  Body Position: position changed independently  Skin Protection:   adhesive use limited   incontinence pads utilized   tubing/devices free from skin contact  Taken 8/14/2022 1000 by Shayla Heath RN  Body Position: position changed independently  Skin Protection:   adhesive use limited   incontinence pads utilized   tubing/devices free from skin contact  Taken 8/14/2022 0800 by Shayla Heath RN  Body Position: position changed independently  Skin Protection:   adhesive use limited   incontinence pads utilized   tubing/devices free from skin contact  Intervention: Prevent and Manage VTE (Venous Thromboembolism) Risk  Recent Flowsheet Documentation  Taken 8/14/2022 1600 by Shayla Heath RN  Activity Management:   activity adjusted per tolerance   activity encouraged  Taken 8/14/2022 1400 by Shayla Heath RN  Activity Management:   activity adjusted per tolerance   activity encouraged  Taken 8/14/2022 1200 by Shayla Heath RN  Activity Management:   activity adjusted per tolerance   activity encouraged  Taken 8/14/2022 1000 by Shayla Heath RN  Activity Management:   activity adjusted per tolerance   activity encouraged  Taken 8/14/2022 0800 by Shayla Heath RN  Activity Management:   activity adjusted per tolerance   activity encouraged  VTE Prevention/Management:   bilateral   sequential compression devices off   patient refused intervention  Intervention: Prevent Infection  Recent Flowsheet Documentation  Taken 8/14/2022 1600 by Kumar  KEMI Mcguire  Infection Prevention: environmental surveillance performed  Taken 8/14/2022 1400 by Shayla Heath RN  Infection Prevention: environmental surveillance performed  Taken 8/14/2022 1200 by Shayla Heath RN  Infection Prevention: environmental surveillance performed  Taken 8/14/2022 1000 by Shayla Heath RN  Infection Prevention: environmental surveillance performed  Taken 8/14/2022 0800 by Shayla Heath RN  Infection Prevention: environmental surveillance performed  Goal: Optimal Comfort and Wellbeing  Outcome: Ongoing, Progressing  Intervention: Provide Person-Centered Care  Recent Flowsheet Documentation  Taken 8/14/2022 0800 by Shayla Heath RN  Trust Relationship/Rapport:   care explained   choices provided   thoughts/feelings acknowledged  Goal: Readiness for Transition of Care  Outcome: Ongoing, Progressing   Goal Outcome Evaluation:

## 2022-08-15 ENCOUNTER — ANESTHESIA EVENT (OUTPATIENT)
Dept: GASTROENTEROLOGY | Facility: HOSPITAL | Age: 57
End: 2022-08-15

## 2022-08-15 ENCOUNTER — APPOINTMENT (OUTPATIENT)
Dept: INTERVENTIONAL RADIOLOGY/VASCULAR | Facility: HOSPITAL | Age: 57
End: 2022-08-15

## 2022-08-15 ENCOUNTER — APPOINTMENT (OUTPATIENT)
Dept: CT IMAGING | Facility: HOSPITAL | Age: 57
End: 2022-08-15

## 2022-08-15 ENCOUNTER — APPOINTMENT (OUTPATIENT)
Dept: MRI IMAGING | Facility: HOSPITAL | Age: 57
End: 2022-08-15

## 2022-08-15 LAB
ALBUMIN SERPL-MCNC: 3.5 G/DL (ref 3.5–5.2)
ALBUMIN/GLOB SERPL: 0.9 G/DL
ALP SERPL-CCNC: 69 U/L (ref 39–117)
ALT SERPL W P-5'-P-CCNC: 6 U/L (ref 1–41)
ANION GAP SERPL CALCULATED.3IONS-SCNC: 11 MMOL/L (ref 5–15)
AST SERPL-CCNC: 8 U/L (ref 1–40)
BASOPHILS # BLD AUTO: 0.03 10*3/MM3 (ref 0–0.2)
BASOPHILS NFR BLD AUTO: 0.5 % (ref 0–1.5)
BILIRUB SERPL-MCNC: 0.2 MG/DL (ref 0–1.2)
BUN SERPL-MCNC: 9 MG/DL (ref 6–20)
BUN/CREAT SERPL: 13.6 (ref 7–25)
CALCIUM SPEC-SCNC: 9.5 MG/DL (ref 8.6–10.5)
CHLORIDE SERPL-SCNC: 104 MMOL/L (ref 98–107)
CO2 SERPL-SCNC: 23 MMOL/L (ref 22–29)
CREAT SERPL-MCNC: 0.66 MG/DL (ref 0.76–1.27)
DEPRECATED RDW RBC AUTO: 42.4 FL (ref 37–54)
EGFRCR SERPLBLD CKD-EPI 2021: 109.4 ML/MIN/1.73
EOSINOPHIL # BLD AUTO: 0.11 10*3/MM3 (ref 0–0.4)
EOSINOPHIL NFR BLD AUTO: 1.8 % (ref 0.3–6.2)
ERYTHROCYTE [DISTWIDTH] IN BLOOD BY AUTOMATED COUNT: 12 % (ref 12.3–15.4)
GLOBULIN UR ELPH-MCNC: 3.9 GM/DL
GLUCOSE SERPL-MCNC: 110 MG/DL (ref 65–99)
HCT VFR BLD AUTO: 39 % (ref 37.5–51)
HGB BLD-MCNC: 13.1 G/DL (ref 13–17.7)
IMM GRANULOCYTES # BLD AUTO: 0.02 10*3/MM3 (ref 0–0.05)
IMM GRANULOCYTES NFR BLD AUTO: 0.3 % (ref 0–0.5)
LYMPHOCYTES # BLD AUTO: 1.53 10*3/MM3 (ref 0.7–3.1)
LYMPHOCYTES NFR BLD AUTO: 25.3 % (ref 19.6–45.3)
MAGNESIUM SERPL-MCNC: 2 MG/DL (ref 1.6–2.6)
MCH RBC QN AUTO: 32.3 PG (ref 26.6–33)
MCHC RBC AUTO-ENTMCNC: 33.6 G/DL (ref 31.5–35.7)
MCV RBC AUTO: 96.1 FL (ref 79–97)
MONOCYTES # BLD AUTO: 0.61 10*3/MM3 (ref 0.1–0.9)
MONOCYTES NFR BLD AUTO: 10.1 % (ref 5–12)
NEUTROPHILS NFR BLD AUTO: 3.74 10*3/MM3 (ref 1.7–7)
NEUTROPHILS NFR BLD AUTO: 62 % (ref 42.7–76)
NRBC BLD AUTO-RTO: 0 /100 WBC (ref 0–0.2)
PLATELET # BLD AUTO: 178 10*3/MM3 (ref 140–450)
PMV BLD AUTO: 10.4 FL (ref 6–12)
POTASSIUM SERPL-SCNC: 4 MMOL/L (ref 3.5–5.2)
PROT SERPL-MCNC: 7.4 G/DL (ref 6–8.5)
RBC # BLD AUTO: 4.06 10*6/MM3 (ref 4.14–5.8)
SODIUM SERPL-SCNC: 138 MMOL/L (ref 136–145)
UFH PPP CHRO-ACNC: 0.1 IU/ML (ref 0.3–0.7)
UFH PPP CHRO-ACNC: 0.26 IU/ML (ref 0.3–0.7)
UFH PPP CHRO-ACNC: 0.34 IU/ML (ref 0.3–0.7)
WBC NRBC COR # BLD: 6.04 10*3/MM3 (ref 3.4–10.8)

## 2022-08-15 PROCEDURE — C1876 STENT, NON-COA/NON-COV W/DEL: HCPCS

## 2022-08-15 PROCEDURE — 25010000002 MORPHINE PER 10 MG: Performed by: INTERNAL MEDICINE

## 2022-08-15 PROCEDURE — C1887 CATHETER, GUIDING: HCPCS

## 2022-08-15 PROCEDURE — 83735 ASSAY OF MAGNESIUM: CPT | Performed by: INTERNAL MEDICINE

## 2022-08-15 PROCEDURE — 0 GADOBENATE DIMEGLUMINE 529 MG/ML SOLUTION: Performed by: INTERNAL MEDICINE

## 2022-08-15 PROCEDURE — 0 LIDOCAINE 1 % SOLUTION: Performed by: INTERNAL MEDICINE

## 2022-08-15 PROCEDURE — 99222 1ST HOSP IP/OBS MODERATE 55: CPT | Performed by: INTERNAL MEDICINE

## 2022-08-15 PROCEDURE — 25010000002 MIDAZOLAM PER 1 MG: Performed by: RADIOLOGY

## 2022-08-15 PROCEDURE — C1769 GUIDE WIRE: HCPCS

## 2022-08-15 PROCEDURE — 027V3DZ DILATION OF SUPERIOR VENA CAVA WITH INTRALUMINAL DEVICE, PERCUTANEOUS APPROACH: ICD-10-PCS | Performed by: RADIOLOGY

## 2022-08-15 PROCEDURE — C1894 INTRO/SHEATH, NON-LASER: HCPCS

## 2022-08-15 PROCEDURE — 05H33DZ INSERTION OF INTRALUMINAL DEVICE INTO RIGHT INNOMINATE VEIN, PERCUTANEOUS APPROACH: ICD-10-PCS | Performed by: RADIOLOGY

## 2022-08-15 PROCEDURE — 85520 HEPARIN ASSAY: CPT

## 2022-08-15 PROCEDURE — 80053 COMPREHEN METABOLIC PANEL: CPT | Performed by: FAMILY MEDICINE

## 2022-08-15 PROCEDURE — 25010000002 IOPAMIDOL 61 % SOLUTION

## 2022-08-15 PROCEDURE — 99153 MOD SED SAME PHYS/QHP EA: CPT

## 2022-08-15 PROCEDURE — C1725 CATH, TRANSLUMIN NON-LASER: HCPCS

## 2022-08-15 PROCEDURE — 71250 CT THORAX DX C-: CPT

## 2022-08-15 PROCEDURE — 99233 SBSQ HOSP IP/OBS HIGH 50: CPT | Performed by: INTERNAL MEDICINE

## 2022-08-15 PROCEDURE — 75827 VEIN X-RAY CHEST: CPT

## 2022-08-15 PROCEDURE — 25010000002 FENTANYL CITRATE (PF) 50 MCG/ML SOLUTION: Performed by: RADIOLOGY

## 2022-08-15 PROCEDURE — 70553 MRI BRAIN STEM W/O & W/DYE: CPT

## 2022-08-15 PROCEDURE — A9577 INJ MULTIHANCE: HCPCS | Performed by: INTERNAL MEDICINE

## 2022-08-15 PROCEDURE — 99152 MOD SED SAME PHYS/QHP 5/>YRS: CPT

## 2022-08-15 PROCEDURE — 85025 COMPLETE CBC W/AUTO DIFF WBC: CPT | Performed by: INTERNAL MEDICINE

## 2022-08-15 PROCEDURE — 76937 US GUIDE VASCULAR ACCESS: CPT

## 2022-08-15 PROCEDURE — 25010000002 HEPARIN (PORCINE) 25000-0.45 UT/250ML-% SOLUTION

## 2022-08-15 RX ORDER — HEPARIN SODIUM 1000 [USP'U]/ML
INJECTION, SOLUTION INTRAVENOUS; SUBCUTANEOUS
Status: DISCONTINUED
Start: 2022-08-15 | End: 2022-08-15 | Stop reason: WASHOUT

## 2022-08-15 RX ORDER — HYDROCODONE BITARTRATE AND ACETAMINOPHEN 10; 325 MG/1; MG/1
1 TABLET ORAL EVERY 6 HOURS PRN
Status: DISCONTINUED | OUTPATIENT
Start: 2022-08-15 | End: 2022-08-17 | Stop reason: HOSPADM

## 2022-08-15 RX ORDER — FAMOTIDINE 10 MG/ML
20 INJECTION, SOLUTION INTRAVENOUS ONCE
Status: CANCELLED | OUTPATIENT
Start: 2022-08-15 | End: 2022-08-15

## 2022-08-15 RX ORDER — FENTANYL CITRATE 50 UG/ML
INJECTION, SOLUTION INTRAMUSCULAR; INTRAVENOUS
Status: DISPENSED
Start: 2022-08-15 | End: 2022-08-16

## 2022-08-15 RX ORDER — HEPARIN SODIUM 200 [USP'U]/100ML
INJECTION, SOLUTION INTRAVENOUS
Status: DISPENSED
Start: 2022-08-15 | End: 2022-08-16

## 2022-08-15 RX ORDER — SODIUM CHLORIDE 0.9 % (FLUSH) 0.9 %
10 SYRINGE (ML) INJECTION EVERY 12 HOURS SCHEDULED
Status: CANCELLED | OUTPATIENT
Start: 2022-08-15

## 2022-08-15 RX ORDER — LIDOCAINE HYDROCHLORIDE 10 MG/ML
0.5 INJECTION, SOLUTION EPIDURAL; INFILTRATION; INTRACAUDAL; PERINEURAL ONCE AS NEEDED
Status: CANCELLED | OUTPATIENT
Start: 2022-08-15

## 2022-08-15 RX ORDER — FENTANYL CITRATE 50 UG/ML
INJECTION, SOLUTION INTRAMUSCULAR; INTRAVENOUS
Status: COMPLETED | OUTPATIENT
Start: 2022-08-15 | End: 2022-08-15

## 2022-08-15 RX ORDER — LIDOCAINE HYDROCHLORIDE 10 MG/ML
4 INJECTION, SOLUTION INFILTRATION; PERINEURAL ONCE
Status: COMPLETED | OUTPATIENT
Start: 2022-08-15 | End: 2022-08-15

## 2022-08-15 RX ORDER — MIDAZOLAM HYDROCHLORIDE 1 MG/ML
INJECTION INTRAMUSCULAR; INTRAVENOUS
Status: DISPENSED
Start: 2022-08-15 | End: 2022-08-16

## 2022-08-15 RX ORDER — MIDAZOLAM HYDROCHLORIDE 1 MG/ML
INJECTION INTRAMUSCULAR; INTRAVENOUS
Status: COMPLETED | OUTPATIENT
Start: 2022-08-15 | End: 2022-08-15

## 2022-08-15 RX ORDER — FAMOTIDINE 20 MG/1
20 TABLET, FILM COATED ORAL ONCE
Status: CANCELLED | OUTPATIENT
Start: 2022-08-15 | End: 2022-08-15

## 2022-08-15 RX ADMIN — FENTANYL CITRATE 50 MCG: 50 INJECTION, SOLUTION INTRAMUSCULAR; INTRAVENOUS at 15:03

## 2022-08-15 RX ADMIN — THIAMINE HCL TAB 100 MG 100 MG: 100 TAB at 08:24

## 2022-08-15 RX ADMIN — GADOBENATE DIMEGLUMINE 16 ML: 529 INJECTION, SOLUTION INTRAVENOUS at 23:01

## 2022-08-15 RX ADMIN — IOPAMIDOL 25 ML: 612 INJECTION, SOLUTION INTRAVENOUS at 15:48

## 2022-08-15 RX ADMIN — MIDAZOLAM HYDROCHLORIDE 1 MG: 1 INJECTION, SOLUTION INTRAMUSCULAR; INTRAVENOUS at 15:08

## 2022-08-15 RX ADMIN — PANTOPRAZOLE SODIUM 40 MG: 40 TABLET, DELAYED RELEASE ORAL at 05:44

## 2022-08-15 RX ADMIN — MORPHINE SULFATE 2 MG: 2 INJECTION, SOLUTION INTRAMUSCULAR; INTRAVENOUS at 08:25

## 2022-08-15 RX ADMIN — LIDOCAINE HYDROCHLORIDE 4 ML: 10 INJECTION, SOLUTION INFILTRATION; PERINEURAL at 15:55

## 2022-08-15 RX ADMIN — HEPARIN SODIUM 26 UNITS/KG/HR: 10000 INJECTION, SOLUTION INTRAVENOUS at 05:53

## 2022-08-15 RX ADMIN — MORPHINE SULFATE 2 MG: 2 INJECTION, SOLUTION INTRAMUSCULAR; INTRAVENOUS at 23:09

## 2022-08-15 RX ADMIN — SENNOSIDES AND DOCUSATE SODIUM 2 TABLET: 50; 8.6 TABLET ORAL at 20:42

## 2022-08-15 RX ADMIN — MIDAZOLAM HYDROCHLORIDE 1 MG: 1 INJECTION, SOLUTION INTRAMUSCULAR; INTRAVENOUS at 15:03

## 2022-08-15 RX ADMIN — MORPHINE SULFATE 2 MG: 2 INJECTION, SOLUTION INTRAMUSCULAR; INTRAVENOUS at 03:29

## 2022-08-15 RX ADMIN — Medication 10 ML: at 08:25

## 2022-08-15 RX ADMIN — Medication 10 ML: at 20:42

## 2022-08-15 RX ADMIN — Medication 1 PATCH: at 23:09

## 2022-08-15 RX ADMIN — HYDROCODONE BITARTRATE AND ACETAMINOPHEN 1 TABLET: 10; 325 TABLET ORAL at 11:06

## 2022-08-15 RX ADMIN — FOLIC ACID 1 MG: 1 TABLET ORAL at 08:24

## 2022-08-15 RX ADMIN — HEPARIN SODIUM 28 UNITS/KG/HR: 10000 INJECTION, SOLUTION INTRAVENOUS at 17:33

## 2022-08-15 RX ADMIN — MULTIVITAMIN TABLET 1 TABLET: TABLET at 08:24

## 2022-08-15 RX ADMIN — FENTANYL CITRATE 50 MCG: 50 INJECTION, SOLUTION INTRAMUSCULAR; INTRAVENOUS at 15:08

## 2022-08-15 RX ADMIN — Medication 5 MG: at 23:10

## 2022-08-15 RX ADMIN — HYDROCODONE BITARTRATE AND ACETAMINOPHEN 1 TABLET: 10; 325 TABLET ORAL at 19:26

## 2022-08-15 NOTE — PLAN OF CARE
Problem: Adult Inpatient Plan of Care  Goal: Plan of Care Review  Outcome: Ongoing, Progressing  Goal: Patient-Specific Goal (Individualized)  Outcome: Ongoing, Progressing  Goal: Absence of Hospital-Acquired Illness or Injury  Outcome: Ongoing, Progressing  Intervention: Identify and Manage Fall Risk  Recent Flowsheet Documentation  Taken 8/15/2022 0400 by Froylan Phillip RN  Safety Promotion/Fall Prevention:   activity supervised   assistive device/personal items within reach   clutter free environment maintained   fall prevention program maintained   lighting adjusted   nonskid shoes/slippers when out of bed   room organization consistent   safety round/check completed  Taken 8/15/2022 0200 by Froylan Phillip RN  Safety Promotion/Fall Prevention:   activity supervised   assistive device/personal items within reach   clutter free environment maintained   fall prevention program maintained   lighting adjusted   nonskid shoes/slippers when out of bed   room organization consistent   safety round/check completed  Taken 8/15/2022 0000 by Froylan Phillip RN  Safety Promotion/Fall Prevention:   activity supervised   assistive device/personal items within reach   clutter free environment maintained   fall prevention program maintained   lighting adjusted   nonskid shoes/slippers when out of bed   room organization consistent   safety round/check completed  Taken 8/14/2022 2200 by Froylan Phillip RN  Safety Promotion/Fall Prevention:   activity supervised   assistive device/personal items within reach   clutter free environment maintained   fall prevention program maintained   lighting adjusted   nonskid shoes/slippers when out of bed   room organization consistent   safety round/check completed  Taken 8/14/2022 2000 by Froylan Phillip RN  Safety Promotion/Fall Prevention:   assistive device/personal items within reach   clutter free environment maintained   fall prevention program maintained   lighting  adjusted   nonskid shoes/slippers when out of bed   room organization consistent   safety round/check completed  Intervention: Prevent Skin Injury  Recent Flowsheet Documentation  Taken 8/15/2022 0400 by Froylan Phillip RN  Skin Protection:   adhesive use limited   incontinence pads utilized   transparent dressing maintained   tubing/devices free from skin contact  Taken 8/15/2022 0200 by Froylan Phillip RN  Skin Protection:   adhesive use limited   incontinence pads utilized   transparent dressing maintained   tubing/devices free from skin contact  Taken 8/15/2022 0000 by Froylan Phillip RN  Skin Protection:   adhesive use limited   incontinence pads utilized   transparent dressing maintained   tubing/devices free from skin contact  Taken 8/14/2022 2200 by Froylan Phillip RN  Skin Protection:   adhesive use limited   incontinence pads utilized   transparent dressing maintained   tubing/devices free from skin contact  Taken 8/14/2022 2000 by Froylan Phillip RN  Skin Protection:   adhesive use limited   incontinence pads utilized   transparent dressing maintained   tubing/devices free from skin contact  Intervention: Prevent and Manage VTE (Venous Thromboembolism) Risk  Recent Flowsheet Documentation  Taken 8/15/2022 0400 by Froylan Phillip RN  Activity Management: activity adjusted per tolerance  Taken 8/15/2022 0200 by Froylan Phillip RN  Activity Management: activity adjusted per tolerance  Taken 8/15/2022 0000 by Froylan Phillip RN  Activity Management: activity adjusted per tolerance  Taken 8/14/2022 2200 by Froylan Phillip RN  Activity Management: activity adjusted per tolerance  Taken 8/14/2022 2000 by Froylan Phillip RN  Activity Management: activity adjusted per tolerance  VTE Prevention/Management:   bilateral   sequential compression devices off  Intervention: Prevent Infection  Recent Flowsheet Documentation  Taken 8/15/2022 0400 by Froylan Phillip RN  Infection  Prevention:   environmental surveillance performed   rest/sleep promoted  Taken 8/15/2022 0200 by Froylan Phillip RN  Infection Prevention:   environmental surveillance performed   rest/sleep promoted  Taken 8/15/2022 0000 by Froylan Phillip RN  Infection Prevention:   environmental surveillance performed   rest/sleep promoted  Taken 8/14/2022 2200 by Froylan Phillip RN  Infection Prevention:   environmental surveillance performed   rest/sleep promoted  Taken 8/14/2022 2000 by Froylan Phillip RN  Infection Prevention:   environmental surveillance performed   rest/sleep promoted  Goal: Optimal Comfort and Wellbeing  Outcome: Ongoing, Progressing  Intervention: Monitor Pain and Promote Comfort  Recent Flowsheet Documentation  Taken 8/15/2022 0400 by Froylan Phillip RN  Pain Management Interventions:   see MAR   quiet environment facilitated  Taken 8/15/2022 0000 by Froylan Phillip RN  Pain Management Interventions:   see MAR   quiet environment facilitated  Taken 8/14/2022 2200 by Froylan Phillip RN  Pain Management Interventions: quiet environment facilitated  Taken 8/14/2022 2000 by Froylan Phillip RN  Pain Management Interventions: see MAR  Intervention: Provide Person-Centered Care  Recent Flowsheet Documentation  Taken 8/15/2022 0400 by Froylan Phillip RN  Trust Relationship/Rapport:   care explained   choices provided   reassurance provided   thoughts/feelings acknowledged  Taken 8/15/2022 0200 by Froylan Phillip RN  Trust Relationship/Rapport:   care explained   choices provided   reassurance provided   thoughts/feelings acknowledged  Taken 8/15/2022 0000 by Froylan Phillip RN  Trust Relationship/Rapport:   care explained   choices provided   reassurance provided   thoughts/feelings acknowledged  Taken 8/14/2022 2200 by Froylan Phillip RN  Trust Relationship/Rapport:   care explained   choices provided   reassurance provided   thoughts/feelings acknowledged  Taken 8/14/2022  2000 by Froylan Phillip RN  Trust Relationship/Rapport:   care explained   choices provided   reassurance provided   thoughts/feelings acknowledged  Goal: Readiness for Transition of Care  Outcome: Ongoing, Progressing     Problem: Pain Acute  Goal: Acceptable Pain Control and Functional Ability  Outcome: Ongoing, Progressing  Intervention: Prevent or Manage Pain  Recent Flowsheet Documentation  Taken 8/15/2022 0400 by Froylan Phillip RN  Medication Review/Management: medications reviewed  Taken 8/15/2022 0200 by Froylan Phillip RN  Medication Review/Management: medications reviewed  Taken 8/15/2022 0000 by Froylan Phillip RN  Medication Review/Management: medications reviewed  Taken 8/14/2022 2200 by Froylan Phillip RN  Medication Review/Management: medications reviewed  Taken 8/14/2022 2000 by Froylan Phillip RN  Medication Review/Management: medications reviewed  Intervention: Develop Pain Management Plan  Recent Flowsheet Documentation  Taken 8/15/2022 0400 by Froylan Phillip RN  Pain Management Interventions:   see MAR   quiet environment facilitated  Taken 8/15/2022 0000 by Froylan Phillip RN  Pain Management Interventions:   see MAR   quiet environment facilitated  Taken 8/14/2022 2200 by Froylan Phillip RN  Pain Management Interventions: quiet environment facilitated  Taken 8/14/2022 2000 by Froylan Phillip RN  Pain Management Interventions: see MAR  Intervention: Optimize Psychosocial Wellbeing  Recent Flowsheet Documentation  Taken 8/15/2022 0400 by Froylan Phillip RN  Diversional Activities: television  Taken 8/15/2022 0000 by Froylan Phillip RN  Diversional Activities: television  Taken 8/14/2022 2200 by Froylan Phillip RN  Diversional Activities: television  Taken 8/14/2022 2000 by Froylan Phillip RN  Diversional Activities: television   Goal Outcome Evaluation:
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
## 2022-08-15 NOTE — PROGRESS NOTES
[facility] Medicine Services  PROGRESS NOTE    Patient Name: Mitesh Molina  : 1965  MRN: [id number]    Date of Admission: 2022  Primary Care Physician: Christophe Garcia APRN    Subjective   Subjective     CC:  F/u SVC sydrome    HPI:  Patient sitting up in bed. Complains of a lot of pain and pressure in his chest.    ROS:  Gen- No fevers, chills  CV- + chest pain, palpitations  Resp- No cough, dyspnea  GI- No N/V/D, abd pain    Objective   Objective     Vital Signs:   Temp:  [98 °F (36.7 °C)-98.4 °F (36.9 °C)] 98 °F (36.7 °C)  Heart Rate:  [] 104  Resp:  [14-18] 18  BP: (114-155)/() 136/100     Physical Exam:  Constitutional: No acute distress, awake, alert, ill appearing  HENT: NCAT, mucous membranes moist, facial redness  Respiratory: Clear to auscultation bilaterally, respiratory effort normal   Cardiovascular: RRR, no murmurs, rubs, or gallops  Gastrointestinal: soft, nontender, nondistended  Musculoskeletal: No bilateral ankle edema  Psychiatric: Appropriate affect, cooperative  Neurologic: Oriented x 3, speech is clear  Skin: No rashes      Results Reviewed:  LAB RESULTS:      Lab 08/15/22  0938 08/15/22  0227 22  1643 22  0457 22  2310   WBC  --  6.04  --  6.29 6.23   HEMOGLOBIN  --  13.1  --  13.3 13.5   HEMATOCRIT  --  39.0  --  39.6 40.4   PLATELETS  --  178  --  183 178   NEUTROS ABS  --  3.74  --  4.11 3.94   IMMATURE GRANS (ABS)  --  0.02  --  0.02 0.01   LYMPHS ABS  --  1.53  --  1.39 1.53   MONOS ABS  --  0.61  --  0.64 0.65   EOS ABS  --  0.11  --  0.10 0.08   MCV  --  96.1  --  97.3* 96.9   PROCALCITONIN  --   --   --   --  0.04   LACTATE  --   --   --   --  0.9   PROTIME  --   --   --   --  13.1   APTT  --   --   --  37.5*  --    HEPARIN ANTI-XA 0.26* 0.34 0.10* 0.10*  --          Lab 08/15/22  0227 22  0457 22  2310   SODIUM 138 137 139   POTASSIUM 4.0 3.8 3.8   CHLORIDE 104 104 104   CO2 23.0 23.0 26.0    ANION GAP 11.0 10.0 9.0   BUN 9 7 5*   CREATININE 0.66* 0.58* 0.59*   EGFR 109.4 113.8 113.2   GLUCOSE 110* 98 91   CALCIUM 9.5 9.5 9.9   MAGNESIUM 2.0 2.1  --          Lab 08/15/22  0227 08/13/22 2310   TOTAL PROTEIN 7.4 7.5   ALBUMIN 3.50 3.60   GLOBULIN 3.9 3.9   ALT (SGPT) 6 6   AST (SGOT) 8 9   BILIRUBIN 0.2 0.3   ALK PHOS 69 73         Lab 08/13/22 2310   PROTIME 13.1   INR 1.00             Lab 08/14/22  0457 08/13/22 2310   ABO TYPING O O   RH TYPING Negative Negative   ANTIBODY SCREEN  --  Negative         Brief Urine Lab Results     None          Microbiology Results Abnormal     Procedure Component Value - Date/Time    Blood Culture - Blood, Arm, Right [[id number]]  (Normal) Collected: 08/13/22 2310    Lab Status: Preliminary result Specimen: Blood from Arm, Right Updated: 08/15/22 0704     Blood Culture No growth at 24 hours    Blood Culture - Blood, Arm, Left [[id number]]  (Normal) Collected: 08/13/22 2310    Lab Status: Preliminary result Specimen: Blood from Arm, Left Updated: 08/15/22 0704     Blood Culture No growth at 24 hours    COVID PRE-OP / PRE-PROCEDURE SCREENING ORDER (NO ISOLATION) - Swab, Nasopharynx [[id number]]  (Normal) Collected: 08/13/22 2334    Lab Status: Final result Specimen: Swab from Nasopharynx Updated: 08/14/22 0032    Narrative:      The following orders were created for panel order COVID PRE-OP / PRE-PROCEDURE SCREENING ORDER (NO ISOLATION) - Swab, Nasopharynx.  Procedure                               Abnormality         Status                     ---------                               -----------         ------                     Respiratory Panel PCR w/...[[id number]]  Normal              Final result                 Please view results for these tests on the individual orders.    Respiratory Panel PCR w/COVID-19(SARS-CoV-2) KELY/EDI/TERI/PAD/COR/MAD/MONTY In-House, NP Swab in UTM/VTM, 3-4 HR TAT - Swab, Nasopharynx [[id number]]  (Normal) Collected: 08/13/22 2334    Lab  Status: Final result Specimen: Swab from Nasopharynx Updated: 08/14/22 0032     ADENOVIRUS, PCR Not Detected     Coronavirus 229E Not Detected     Coronavirus HKU1 Not Detected     Coronavirus NL63 Not Detected     Coronavirus OC43 Not Detected     COVID19 Not Detected     Human Metapneumovirus Not Detected     Human Rhinovirus/Enterovirus Not Detected     Influenza A PCR Not Detected     Influenza B PCR Not Detected     Parainfluenza Virus 1 Not Detected     Parainfluenza Virus 2 Not Detected     Parainfluenza Virus 3 Not Detected     Parainfluenza Virus 4 Not Detected     RSV, PCR Not Detected     Bordetella pertussis pcr Not Detected     Bordetella parapertussis PCR Not Detected     Chlamydophila pneumoniae PCR Not Detected     Mycoplasma pneumo by PCR Not Detected    Narrative:      In the setting of a positive respiratory panel with a viral infection PLUS a negative procalcitonin without other underlying concern for bacterial infection, consider observing off antibiotics or discontinuation of antibiotics and continue supportive care. If the respiratory panel is positive for atypical bacterial infection (Bordetella pertussis, Chlamydophila pneumoniae, or Mycoplasma pneumoniae), consider antibiotic de-escalation to target atypical bacterial infection.          CT Outside Films    Result Date: 8/14/2022  This procedure was auto-finalized with no dictation required.    CT Outside Films    Result Date: 8/14/2022  This procedure was auto-finalized with no dictation required.    CT Outside Films    Result Date: 8/14/2022  This procedure was auto-finalized with no dictation required.          I have reviewed the medications:  Scheduled Meds:thiamine, 100 mg, Oral, Daily   And  multivitamin, 1 tablet, Oral, Daily   And  folic acid, 1 mg, Oral, Daily  nicotine, 1 patch, Transdermal, Q24H  pantoprazole, 40 mg, Oral, Q AM  senna-docusate sodium, 2 tablet, Oral, BID  sodium chloride, 10 mL, Intravenous, Q12H      Continuous  Infusions:heparin, 28 Units/kg/hr, Last Rate: 26 Units/kg/hr (08/15/22 0553)  Pharmacy to Dose Heparin,       PRN Meds:.•  acetaminophen **OR** acetaminophen **OR** acetaminophen  •  senna-docusate sodium **AND** polyethylene glycol **AND** bisacodyl **AND** bisacodyl  •  LORazepam **OR** diazePAM **OR** LORazepam **OR** diazePAM **OR** diazePAM  •  HYDROcodone-acetaminophen  •  melatonin  •  Morphine  •  ondansetron  •  Pharmacy to Dose Heparin  •  sodium chloride    Assessment & Plan   Assessment & Plan     Active Hospital Problems    Diagnosis  POA   • Jugular vein thrombosis [I82.890]  Unknown   • SVC syndrome [I87.1]  Yes   • Pulmonary mass [R91.8]  Yes   • Renal mass [N28.89]  Yes   • High blood pressure [I10]  Yes   • Marijuana use [F12.90]  Yes   • Tobacco dependence [F17.200]  Yes   • Alcohol use [Z72.89]  Yes      Resolved Hospital Problems   No resolved problems to display.        Brief Hospital Course to date:  Mitesh Molina is a 57 y.o. male with PMH of HTN, tobacco/THC use, ETOH abuse transferred from [facility] for SVC syndrome. Imaging there showed right renal mass, RUL pulmonary mass, numerous pulm nodules. And mediastinal adenopathy. CT neck shoed thrombus in both jugular veins, left greater than right with superior vena cava occlusion.     SVC syndrome w/Jugular Vein Thrombosis  - continue heparin drip for now, eventual transition to Xarelto or Eliquis at discharge  - IR planning stent placement today  - PRN IV and PO pain control      Large RUL Mass - Likely Lung Primary  - IR unable to perform biopsy today or tomorrow due to full schedule. D/w Dr. Larson, will consult for Bronch w/biopsy.  - oncology following, if small cell may go ahead and start chemotherapy while inpatient  - Rad Onc consulted, likely palliative radiation to begin in [place] where he lives  - will get MRI brain per oncology recommendations     Right Renal Mass:  - possibly a secondary malignancy, renal cell  carcinoma     Elevated Blood Pressure   - not on BP medications at home, likely driven by pain     Tobacco / Marijuana dependence  - nicotine patch     Alcohol use  - denies h/o withdrawal, drinking 6 pack of beer on average twice weekly  - CIWA protocol w/ PRN oral ativan/IV valium  - thiamine / folic acid / MVI  - seizure precautions     Expected Discharge Location and Transportation: Home  Expected Discharge Date: 8/18    DVT prophylaxis:  Medical and mechanical DVT prophylaxis orders are present.     AM-PAC 6 Clicks Score (PT): 24 (08/14/22 2000)    CODE STATUS:   Code Status and Medical Interventions:   Ordered at: 08/14/22 0011     Code Status (Patient has no pulse and is not breathing):    CPR (Attempt to Resuscitate)     Medical Interventions (Patient has pulse or is breathing):    Full Support       Julieta Chapman,   08/15/22
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
## 2022-08-15 NOTE — PLAN OF CARE
Goal Outcome Evaluation:              Outcome Evaluation: Pt axo, vss, femoral site soft dry and intact ( pt did bend legs multiple times after being instructed not to due to complications/risk), NST, room air, Heparin going at 28 U/kg/hr, pt remaned on bedrest. will continue to monitor

## 2022-08-15 NOTE — PROGRESS NOTES
HEPARIN INFUSION  Mitesh Molina is a  57 y.o. male receiving heparin infusion.     Therapy for (VTE/Cardiac):   VTE  Patient Weight: 77.6 kg  Initial Bolus (Y/N):   No (heparin drip started at OSH)  Any Bolus (Y/N):   Yes         Signs or Symptoms of Bleeding: No    VTE (PE/DVT)   Initial Bolus: 80 units/kg (Max 10,000 units)  Initial rate: 18 units/kg/hr (Max 1,500 units/hr)    Anti Xa Rebolus Infusion Hold time Change infusion Dose   (Units/kg/hr) Next Anti Xa Level Due   < 0.11 50 Units/kg   (4000 Units Max) None  Increase by  4 Units/kg/hr 6 hours   0.11 - 0.19 25 Units/kg   (2000 Units Max) None  Increase by  3 Units/kg/hr 6 hours   0.2 - 0.29 0 None  Increase by  2 Units/kg/hr 6 hours   0.3 - 0.7 0 None  No Change 6 hours   (after 2 consecutive  levels in range check  qAM)   0.71 - 0.8 0 None  Decrease by  1 Units/kg/hr 6 hours   0.81 - 0.9 0 None Decrease by  2 Units/kg/hr 6 hours   0.91 - 1 0 60 Minutes  Decrease by  3 Units/kg/hr 6 hours   >1 0 Hold  After Anti Xa < 0.7   decrease old rate by  4 Units/kg/hr  Every 2 hours until Anti Xa < 0.7  then when infusion restarts in 6 hours     Results from last 7 days   Lab Units 08/15/22  0227 08/14/22  0457 08/13/22  2310   INR   --   --  1.00   HEMOGLOBIN g/dL 13.1 13.3 13.5   HEMATOCRIT % 39.0 39.6 40.4   PLATELETS 10*3/mm3 178 183 178       Date   Time   Anti-Xa Current Rate (Unit/kg/hr) Bolus   (Units) Rate Change   (Unit/kg/hr) New Rate (Unit/kg/hr) Next anti-Xa Comments  Pump Check Daily   8/13 2315 -- 0 -- +18 18 0500 Heparin drip/bolus started at OSH at ~19:40.  Heparin drip has been off for ~50 minutes.  Resume heparin drip and re-check anti-Xa in ~10 hours   8/14 0457 0.1 18 3800 +4 22 1400 D/w KEMI Shah   8/14 1643 0.1 22 3900 +4 26 0200 Spoke w/RN   8/15 0227 0.34 26 -- -- 26 0900 D/w RN   8/15 0938 0.26 26 -- +2 28 1500 DW RN                                                                                                                                                                                          Leatha Hester, PharmD, BCPS   8/15/2022  10:57 EDT

## 2022-08-15 NOTE — CASE MANAGEMENT/SOCIAL WORK
Discharge Planning Assessment  [facility]     Patient Name: Mitesh Molina  MRN: [id number]  Today's Date: 8/15/2022    Admit Date: 8/13/2022     Discharge Needs Assessment     Row Name 08/15/22 1348       Living Environment    People in Home significant other    Name(s) of People in Home Brenda Donovan(s/o)    Current Living Arrangements home    Primary Care Provided by self    Provides Primary Care For no one    Family Caregiver if Needed sibling(s);significant other    Family Caregiver Names Brenda English(s/o)    Quality of Family Relationships involved;helpful    Able to Return to Prior Arrangements yes    Living Arrangement Comments I spoke with pt and pt's sister in room with permission regarding discharge plan.  Pt resides in [facility] in a home with s/o       Transition Planning    Patient/Family Anticipates Transition to home with family    Patient/Family Anticipated Services at Transition     Transportation Anticipated family or friend will provide       Discharge Needs Assessment    Readmission Within the Last 30 Days no previous admission in last 30 days    Equipment Currently Used at Home none    Concerns to be Addressed adjustment to diagnosis/illness;discharge planning    Anticipated Changes Related to Illness other (see comments)  adjustment to diagnosis    Equipment Needed After Discharge other (see comments)  TBD    Discharge Facility/Level of Care Needs other (see comments)  TBD    Independent with ADLs and uses no DME or HH.;               Discharge Plan     Row Name 08/15/22 9072       Plan    Plan discharge plan    Plan Comments Pt reports he has [employer] insurance with prescription coverage and uses [facility] in [place]. Pt was a recent transfer from [facility] with SVC syndrome. Imaging there showed right renal mass, RUL pulmonary mass, numerous pulm nodules. Pt has pulmonolocy and oncology following. Per pt, plan is home with family at  discharge. CM will cont to follow    Final Discharge Disposition Code 01 - home or self-care              Continued Care and Services - Admitted Since 8/13/2022    Coordination has not been started for this encounter.       Expected Discharge Date and Time     Expected Discharge Date Expected Discharge Time    Aug 17, 2022          Demographic Summary     Row Name 08/15/22 7629       General Information    General Information Comments Pt's PCP is SULEMA CLEMONS       Contact Information    Permission Granted to Share Info With     Contact Information Obtained for     Contact Information Comments Brenda Donovan(s/o) [phone]               Functional Status    No documentation.                Psychosocial    No documentation.                Abuse/Neglect    No documentation.                Legal    No documentation.                Substance Abuse    No documentation.                Patient Forms    No documentation.                   Obdulia Moran RN
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
## 2022-08-15 NOTE — NURSING NOTE
Pt to IR procedure room, assisted to table, placed on all monitors, SR-ST on monitor, HR 90s-100s, no ectopy. A&Ox4, no distress noted.

## 2022-08-15 NOTE — PAYOR COMM NOTE
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              Remeasure/reassess PCT  <0.1                Strongly Discouraged     Remeasure/reassess PCT    As 28 day mortality risk marker: \"Change in Procalcitonin Result\" (>80% or <=80%) if Day 0 (or Day 1) and Day 4 values are available. Refer to http://www."[employer] [email or web address]    Change in PCT <=80%  A decrease of PCT levels below or equal to 80% defines a positive change in PCT test result representing a higher risk for 28-day all-cause mortality of patients diagnosed with severe sepsis for septic shock.    Change in PCT >80%  A decrease of PCT levels of more than 80% defines a negative change in PCT result representing a lower risk for 28-day all-cause mortality of patients diagnosed with severe sepsis or septic shock.       Protime-INR [[id number]]  (Normal) Collected: 08/13/22 2310    Specimen: Blood Updated: 08/13/22 2359     Protime 13.1 Seconds      INR 1.00    Comprehensive Metabolic Panel [[id number]]  (Abnormal) Collected: 08/13/22 2310    Specimen: Blood Updated: 08/13/22 2354     Glucose 91 mg/dL      BUN 5 " mg/dL      Creatinine 0.59 mg/dL      Sodium 139 mmol/L      Potassium 3.8 mmol/L      Chloride 104 mmol/L      CO2 26.0 mmol/L      Calcium 9.9 mg/dL      Total Protein 7.5 g/dL      Albumin 3.60 g/dL      ALT (SGPT) 6 U/L      AST (SGOT) 9 U/L      Alkaline Phosphatase 73 U/L      Total Bilirubin 0.3 mg/dL      Globulin 3.9 gm/dL      Comment: Calculated Result        A/G Ratio 0.9 g/dL      BUN/Creatinine Ratio 8.5     Anion Gap 9.0 mmol/L      eGFR 113.2 mL/min/1.73      Comment: National Kidney Foundation and American Society of Nephrology (ASN) Task Force recommended calculation based on the Chronic Kidney Disease Epidemiology Collaboration (CKD-EPI) equation refit without adjustment for race.       Narrative:      GFR Normal >60  Chronic Kidney Disease <60  Kidney Failure <15      Lactic Acid, Plasma [[id number]]  (Normal) Collected: 08/13/22 2310    Specimen: Blood Updated: 08/13/22 2351     Lactate 0.9 mmol/L      Comment: Falsely depressed results may occur on samples drawn from patients receiving N-Acetylcysteine (NAC) or Metamizole.       CBC & Differential [[id number]]  (Abnormal) Collected: 08/13/22 2310    Specimen: Blood Updated: 08/13/22 2335    Narrative:      The following orders were created for panel order CBC & Differential.  Procedure                               Abnormality         Status                     ---------                               -----------         ------                     CBC Auto Differential[[id number]]        Abnormal            Final result                 Please view results for these tests on the individual orders.    CBC Auto Differential [[id number]]  (Abnormal) Collected: 08/13/22 2310    Specimen: Blood Updated: 08/13/22 2335     WBC 6.23 10*3/mm3      RBC 4.17 10*6/mm3      Hemoglobin 13.5 g/dL      Hematocrit 40.4 %      MCV 96.9 fL      MCH 32.4 pg      MCHC 33.4 g/dL      RDW 12.1 %      RDW-SD 43.2 fl      MPV 10.3 fL      Platelets 178 10*3/mm3       Neutrophil % 63.2 %      Lymphocyte % 24.6 %      Monocyte % 10.4 %      Eosinophil % 1.3 %      Basophil % 0.3 %      Immature Grans % 0.2 %      Neutrophils, Absolute 3.94 10*3/mm3      Lymphocytes, Absolute 1.53 10*3/mm3      Monocytes, Absolute 0.65 10*3/mm3      Eosinophils, Absolute 0.08 10*3/mm3      Basophils, Absolute 0.02 10*3/mm3      Immature Grans, Absolute 0.01 10*3/mm3      nRBC 0.0 /100 WBC           Imaging Results (Last 72 Hours)     Procedure Component Value Units Date/Time    CT Outside Films [[id number]] Resulted: 22     Updated: 22    Narrative:      This procedure was auto-finalized with no dictation required.    CT Outside Films [[id number]] Resulted: 22     Updated: 22    Narrative:      This procedure was auto-finalized with no dictation required.    CT Outside Films [[id number]] Resulted: 22     Updated: 22    Narrative:      This procedure was auto-finalized with no dictation required.                 Physician Progress Notes (last 72 hours)      Julieta Chapman, DO at 08/15/22 1110              [facility] Medicine Services  PROGRESS NOTE    Patient Name: Mitesh Molina  : 1965  MRN: [id number]    Date of Admission: 2022  Primary Care Physician: Christophe Garcia APRN    Subjective   Subjective     CC:  F/u SVC sydrome    HPI:  Patient sitting up in bed. Complains of a lot of pain and pressure in his chest.    ROS:  Gen- No fevers, chills  CV- + chest pain, palpitations  Resp- No cough, dyspnea  GI- No N/V/D, abd pain    Objective   Objective     Vital Signs:   Temp:  [98 °F (36.7 °C)-98.4 °F (36.9 °C)] 98 °F (36.7 °C)  Heart Rate:  [] 104  Resp:  [14-18] 18  BP: (114-155)/() 136/100     Physical Exam:  Constitutional: No acute distress, awake, alert, ill appearing  HENT: NCAT, mucous membranes moist, facial redness  Respiratory: Clear to auscultation bilaterally,  respiratory effort normal   Cardiovascular: RRR, no murmurs, rubs, or gallops  Gastrointestinal: soft, nontender, nondistended  Musculoskeletal: No bilateral ankle edema  Psychiatric: Appropriate affect, cooperative  Neurologic: Oriented x 3, speech is clear  Skin: No rashes      Results Reviewed:  LAB RESULTS:      Lab 08/15/22  0938 08/15/22  0227 08/14/22  1643 08/14/22  0457 08/13/22  2310   WBC  --  6.04  --  6.29 6.23   HEMOGLOBIN  --  13.1  --  13.3 13.5   HEMATOCRIT  --  39.0  --  39.6 40.4   PLATELETS  --  178  --  183 178   NEUTROS ABS  --  3.74  --  4.11 3.94   IMMATURE GRANS (ABS)  --  0.02  --  0.02 0.01   LYMPHS ABS  --  1.53  --  1.39 1.53   MONOS ABS  --  0.61  --  0.64 0.65   EOS ABS  --  0.11  --  0.10 0.08   MCV  --  96.1  --  97.3* 96.9   PROCALCITONIN  --   --   --   --  0.04   LACTATE  --   --   --   --  0.9   PROTIME  --   --   --   --  13.1   APTT  --   --   --  37.5*  --    HEPARIN ANTI-XA 0.26* 0.34 0.10* 0.10*  --          Lab 08/15/22  0227 08/14/22  0457 08/13/22  2310   SODIUM 138 137 139   POTASSIUM 4.0 3.8 3.8   CHLORIDE 104 104 104   CO2 23.0 23.0 26.0   ANION GAP 11.0 10.0 9.0   BUN 9 7 5*   CREATININE 0.66* 0.58* 0.59*   EGFR 109.4 113.8 113.2   GLUCOSE 110* 98 91   CALCIUM 9.5 9.5 9.9   MAGNESIUM 2.0 2.1  --          Lab 08/15/22  0227 08/13/22  2310   TOTAL PROTEIN 7.4 7.5   ALBUMIN 3.50 3.60   GLOBULIN 3.9 3.9   ALT (SGPT) 6 6   AST (SGOT) 8 9   BILIRUBIN 0.2 0.3   ALK PHOS 69 73         Lab 08/13/22  2310   PROTIME 13.1   INR 1.00             Lab 08/14/22  0457 08/13/22 2310   ABO TYPING O O   RH TYPING Negative Negative   ANTIBODY SCREEN  --  Negative         Brief Urine Lab Results     None          Microbiology Results Abnormal     Procedure Component Value - Date/Time    Blood Culture - Blood, Arm, Right [[id number]]  (Normal) Collected: 08/13/22 2310    Lab Status: Preliminary result Specimen: Blood from Arm, Right Updated: 08/15/22 0704     Blood Culture No growth at 24  hours    Blood Culture - Blood, Arm, Left [[id number]]  (Normal) Collected: 08/13/22 2310    Lab Status: Preliminary result Specimen: Blood from Arm, Left Updated: 08/15/22 0704     Blood Culture No growth at 24 hours    COVID PRE-OP / PRE-PROCEDURE SCREENING ORDER (NO ISOLATION) - Swab, Nasopharynx [[id number]]  (Normal) Collected: 08/13/22 2334    Lab Status: Final result Specimen: Swab from Nasopharynx Updated: 08/14/22 0032    Narrative:      The following orders were created for panel order COVID PRE-OP / PRE-PROCEDURE SCREENING ORDER (NO ISOLATION) - Swab, Nasopharynx.  Procedure                               Abnormality         Status                     ---------                               -----------         ------                     Respiratory Panel PCR w/...[[id number]]  Normal              Final result                 Please view results for these tests on the individual orders.    Respiratory Panel PCR w/COVID-19(SARS-CoV-2) KELY/EDI/TERI/PAD/COR/MAD/MONTY In-House, NP Swab in UTM/VTM, 3-4 HR TAT - Swab, Nasopharynx [[id number]]  (Normal) Collected: 08/13/22 2334    Lab Status: Final result Specimen: Swab from Nasopharynx Updated: 08/14/22 0032     ADENOVIRUS, PCR Not Detected     Coronavirus 229E Not Detected     Coronavirus HKU1 Not Detected     Coronavirus NL63 Not Detected     Coronavirus OC43 Not Detected     COVID19 Not Detected     Human Metapneumovirus Not Detected     Human Rhinovirus/Enterovirus Not Detected     Influenza A PCR Not Detected     Influenza B PCR Not Detected     Parainfluenza Virus 1 Not Detected     Parainfluenza Virus 2 Not Detected     Parainfluenza Virus 3 Not Detected     Parainfluenza Virus 4 Not Detected     RSV, PCR Not Detected     Bordetella pertussis pcr Not Detected     Bordetella parapertussis PCR Not Detected     Chlamydophila pneumoniae PCR Not Detected     Mycoplasma pneumo by PCR Not Detected    Narrative:      In the setting of a positive respiratory panel with a  viral infection PLUS a negative procalcitonin without other underlying concern for bacterial infection, consider observing off antibiotics or discontinuation of antibiotics and continue supportive care. If the respiratory panel is positive for atypical bacterial infection (Bordetella pertussis, Chlamydophila pneumoniae, or Mycoplasma pneumoniae), consider antibiotic de-escalation to target atypical bacterial infection.          CT Outside Films    Result Date: 8/14/2022  This procedure was auto-finalized with no dictation required.    CT Outside Films    Result Date: 8/14/2022  This procedure was auto-finalized with no dictation required.    CT Outside Films    Result Date: 8/14/2022  This procedure was auto-finalized with no dictation required.          I have reviewed the medications:  Scheduled Meds:thiamine, 100 mg, Oral, Daily   And  multivitamin, 1 tablet, Oral, Daily   And  folic acid, 1 mg, Oral, Daily  nicotine, 1 patch, Transdermal, Q24H  pantoprazole, 40 mg, Oral, Q AM  senna-docusate sodium, 2 tablet, Oral, BID  sodium chloride, 10 mL, Intravenous, Q12H      Continuous Infusions:heparin, 28 Units/kg/hr, Last Rate: 26 Units/kg/hr (08/15/22 0553)  Pharmacy to Dose Heparin,       PRN Meds:.•  acetaminophen **OR** acetaminophen **OR** acetaminophen  •  senna-docusate sodium **AND** polyethylene glycol **AND** bisacodyl **AND** bisacodyl  •  LORazepam **OR** diazePAM **OR** LORazepam **OR** diazePAM **OR** diazePAM  •  HYDROcodone-acetaminophen  •  melatonin  •  Morphine  •  ondansetron  •  Pharmacy to Dose Heparin  •  sodium chloride    Assessment & Plan   Assessment & Plan     Active Hospital Problems    Diagnosis  POA   • Jugular vein thrombosis [I82.890]  Unknown   • SVC syndrome [I87.1]  Yes   • Pulmonary mass [R91.8]  Yes   • Renal mass [N28.89]  Yes   • High blood pressure [I10]  Yes   • Marijuana use [F12.90]  Yes   • Tobacco dependence [F17.200]  Yes   • Alcohol use [Z72.89]  Yes      Resolved  Hospital Problems   No resolved problems to display.        Brief Hospital Course to date:  Mitesh Molina is a 57 y.o. male with PMH of HTN, tobacco/THC use, ETOH abuse transferred from [facility] for SVC syndrome. Imaging there showed right renal mass, RUL pulmonary mass, numerous pulm nodules. And mediastinal adenopathy. CT neck shoed thrombus in both jugular veins, left greater than right with superior vena cava occlusion.     SVC syndrome w/Jugular Vein Thrombosis  - continue heparin drip for now, eventual transition to Xarelto or Eliquis at discharge  - IR planning stent placement today  - PRN IV and PO pain control      Large RUL Mass - Likely Lung Primary  - IR unable to perform biopsy today or tomorrow due to full schedule. D/w Dr. Larson, will consult for Bronch w/biopsy.  - oncology following, if small cell may go ahead and start chemotherapy while inpatient  - Rad Onc consulted, likely palliative radiation to begin in [place] where he lives  - will get MRI brain per oncology recommendations     Right Renal Mass:  - possibly a secondary malignancy, renal cell carcinoma     Elevated Blood Pressure   - not on BP medications at home, likely driven by pain     Tobacco / Marijuana dependence  - nicotine patch     Alcohol use  - denies h/o withdrawal, drinking 6 pack of beer on average twice weekly  - CIWA protocol w/ PRN oral ativan/IV valium  - thiamine / folic acid / MVI  - seizure precautions     Expected Discharge Location and Transportation: Home  Expected Discharge Date: 8/18    DVT prophylaxis:  Medical and mechanical DVT prophylaxis orders are present.     AM-PAC 6 Clicks Score (PT): 24 (08/14/22 2000)    CODE STATUS:   Code Status and Medical Interventions:   Ordered at: 08/14/22 0011     Code Status (Patient has no pulse and is not breathing):    CPR (Attempt to Resuscitate)     Medical Interventions (Patient has pulse or is breathing):    Full Support       Julieta Chapman,  DO  08/15/22                Electronically signed by Julieta Chapman, DO at 08/15/22 1118     Anabela Sandoval DO at 22 0820              [facility] Medicine Services  PROGRESS NOTE    Patient Name: Mitesh Molina  : 1965  MRN: [id number]    Date of Admission: 2022  Primary Care Physician: Christophe Garcia, RUI    Subjective   Subjective     CC:  F/u SVC syndrome     HPI:  Pt seen resting in bed. States he is tired. He denies any pain currently     ROS:  Gen- No fevers, chills  CV- No chest pain, palpitations  Resp- No cough, dyspnea  GI- No N/V/D, abd pain        Objective   Objective     Vital Signs:   Temp:  [98.5 °F (36.9 °C)-98.6 °F (37 °C)] 98.5 °F (36.9 °C)  Heart Rate:  [] 103  Resp:  [17-18] 18  BP: (138-160)/(82-99) 149/99     Physical Exam:  Constitutional: No acute distress, older than stated age   HENT: NCAT, mucous membranes moist  Respiratory: Clear to auscultation bilaterally, respiratory effort normal   Cardiovascular: RRR, no murmurs, rubs, or gallops  Gastrointestinal: Positive bowel sounds, soft, nontender, nondistended  Musculoskeletal: No bilateral ankle edema  Psychiatric: Appropriate affect, cooperative  Neurologic: Oriented x 3, speech clear  Skin: No rashes      Results Reviewed:  LAB RESULTS:      Lab 22  0457 22  2310   WBC 6.29 6.23   HEMOGLOBIN 13.3 13.5   HEMATOCRIT 39.6 40.4   PLATELETS 183 178   NEUTROS ABS 4.11 3.94   IMMATURE GRANS (ABS) 0.02 0.01   LYMPHS ABS 1.39 1.53   MONOS ABS 0.64 0.65   EOS ABS 0.10 0.08   MCV 97.3* 96.9   PROCALCITONIN  --  0.04   LACTATE  --  0.9   PROTIME  --  13.1   APTT 37.5*  --    HEPARIN ANTI-XA 0.10*  --          Lab 22  0457 22  2310   SODIUM 137 139   POTASSIUM 3.8 3.8   CHLORIDE 104 104   CO2 23.0 26.0   ANION GAP 10.0 9.0   BUN 7 5*   CREATININE 0.58* 0.59*   EGFR 113.8 113.2   GLUCOSE 98 91   CALCIUM 9.5 9.9   MAGNESIUM 2.1  --          Lab  08/13/22 2310   TOTAL PROTEIN 7.5   ALBUMIN 3.60   GLOBULIN 3.9   ALT (SGPT) 6   AST (SGOT) 9   BILIRUBIN 0.3   ALK PHOS 73         Lab 08/13/22 2310   PROTIME 13.1   INR 1.00             Lab 08/13/22 2310   ABO TYPING O   RH TYPING Negative   ANTIBODY SCREEN Negative         Brief Urine Lab Results     None          Microbiology Results Abnormal     Procedure Component Value - Date/Time    COVID PRE-OP / PRE-PROCEDURE SCREENING ORDER (NO ISOLATION) - Swab, Nasopharynx [[id number]]  (Normal) Collected: 08/13/22 2334    Lab Status: Final result Specimen: Swab from Nasopharynx Updated: 08/14/22 0032    Narrative:      The following orders were created for panel order COVID PRE-OP / PRE-PROCEDURE SCREENING ORDER (NO ISOLATION) - Swab, Nasopharynx.  Procedure                               Abnormality         Status                     ---------                               -----------         ------                     Respiratory Panel PCR w/...[[id number]]  Normal              Final result                 Please view results for these tests on the individual orders.    Respiratory Panel PCR w/COVID-19(SARS-CoV-2) KELY/EDI/TERI/PAD/COR/MAD/MONTY In-House, NP Swab in UTM/VTM, 3-4 HR TAT - Swab, Nasopharynx [[id number]]  (Normal) Collected: 08/13/22 2334    Lab Status: Final result Specimen: Swab from Nasopharynx Updated: 08/14/22 0032     ADENOVIRUS, PCR Not Detected     Coronavirus 229E Not Detected     Coronavirus HKU1 Not Detected     Coronavirus NL63 Not Detected     Coronavirus OC43 Not Detected     COVID19 Not Detected     Human Metapneumovirus Not Detected     Human Rhinovirus/Enterovirus Not Detected     Influenza A PCR Not Detected     Influenza B PCR Not Detected     Parainfluenza Virus 1 Not Detected     Parainfluenza Virus 2 Not Detected     Parainfluenza Virus 3 Not Detected     Parainfluenza Virus 4 Not Detected     RSV, PCR Not Detected     Bordetella pertussis pcr Not Detected     Bordetella parapertussis  PCR Not Detected     Chlamydophila pneumoniae PCR Not Detected     Mycoplasma pneumo by PCR Not Detected    Narrative:      In the setting of a positive respiratory panel with a viral infection PLUS a negative procalcitonin without other underlying concern for bacterial infection, consider observing off antibiotics or discontinuation of antibiotics and continue supportive care. If the respiratory panel is positive for atypical bacterial infection (Bordetella pertussis, Chlamydophila pneumoniae, or Mycoplasma pneumoniae), consider antibiotic de-escalation to target atypical bacterial infection.          CT Outside Films    Result Date: 8/14/2022  This procedure was auto-finalized with no dictation required.    CT Outside Films    Result Date: 8/14/2022  This procedure was auto-finalized with no dictation required.    CT Outside Films    Result Date: 8/14/2022  This procedure was auto-finalized with no dictation required.          I have reviewed the medications:  Scheduled Meds:thiamine, 100 mg, Oral, Daily   And  multivitamin, 1 tablet, Oral, Daily   And  folic acid, 1 mg, Oral, Daily  heparin (porcine), 3,800 Units, Intravenous, Once  nicotine, 1 patch, Transdermal, Q24H  pantoprazole, 40 mg, Oral, Q AM  senna-docusate sodium, 2 tablet, Oral, BID  sodium chloride, 10 mL, Intravenous, Q12H      Continuous Infusions:heparin, 22 Units/kg/hr, Last Rate: 18 Units/kg/hr (08/13/22 2330)  Pharmacy to Dose Heparin,       PRN Meds:.•  acetaminophen **OR** acetaminophen **OR** acetaminophen  •  senna-docusate sodium **AND** polyethylene glycol **AND** bisacodyl **AND** bisacodyl  •  LORazepam **OR** diazePAM **OR** LORazepam **OR** diazePAM **OR** diazePAM  •  melatonin  •  Morphine  •  ondansetron  •  Pharmacy to Dose Heparin  •  sodium chloride    Assessment & Plan   Assessment & Plan     Active Hospital Problems    Diagnosis  POA   • Jugular vein thrombosis [I82.890]  Unknown   • SVC syndrome [I87.1]  Yes   • Pulmonary  mass [R91.8]  Yes   • Renal mass [N28.89]  Yes   • High blood pressure [I10]  Yes   • Marijuana use [F12.90]  Yes   • Tobacco dependence [F17.200]  Yes   • Alcohol use [Z72.89]  Yes      Resolved Hospital Problems   No resolved problems to display.        Brief Hospital Course to date:  Mitesh Molina is a 57 y.o. male with PMH of HTN, tobacco/THC use, ETOH abuse transferred from [facility] for SVC syndrome. Imaging there showed right renal mass, RUL pulmonary mass, numerous pulm nodules. And mediastinal adenopathy. CT neck shoed thrombus in both jugular veins, left greater than right with superior vena cava occlusion.     SVC syndrome  Jugular vein thrombosis   - continue heparin drip  - IR consult for evaluation, possible stenting  - NPO after MN  - IV morphine PRN for pain control  - PPI for stress ulcer prophylaxis     Pulmonary / Renal Mass  - oncology consulted        Elevated Blood Pressure   - /93 on arrival  - not on BP medications at home     Tobacco / marijuana dependence  - nicotine patch       Alcohol use  - denies h/o withdrawal, drinking 6 pack of beer on average twice weekly  - CIWA protocol w/ PRN oral ativan/IV valium  - thiamine / folic acid / MVI  - seizure precautions         Expected Discharge Location and Transportation: Home   Expected Discharge Date: 8/17    DVT prophylaxis:  Medical and mechanical DVT prophylaxis orders are present.          CODE STATUS:   Code Status and Medical Interventions:   Ordered at: 08/14/22 0011     Code Status (Patient has no pulse and is not breathing):    CPR (Attempt to Resuscitate)     Medical Interventions (Patient has pulse or is breathing):    Full Support       Anabela Sandoval DO  08/14/22                Electronically signed by Anabela Sandoval DO at 08/14/22 1800          Consult Notes (last 72 hours)      Laci Larson DO at 08/15/22 1206      Consult Orders    1. Inpatient Pulmonology Consult  "[[id number]] ordered by Julieta Chapman DO at 08/15/22 1110               Pulmonary Consult Note     Chief Complaint:  Pulmonary mass    History of Present Illness     57-year-old male with a past medical history significant for hypertension, tobacco abuse, alcohol abuse, and COPD.  He presented [facility] on 8/13/2022, with swelling of the right face.  CT scan of the chest performed at outside hospital showed a large right upper lobe mass that was 7.1 x 4.2 cm with a large mediastinal tumor causing obstruction of the SVC with extensive mediastinal adenopathy.  Pulmonary was consulted for possible biopsy.  Patient currently notes that he has significant pain on the right side of his chest and back has been present for the last few days.  The pain radiates all the way down to his low back to the top of his shoulder.  He has also noticed extensive swelling of the right side of his face/neck.  Denies any hemoptysis.  He is not requiring any oxygen.  Denies any nausea, vomiting, fever, or chills.    Problem List, Surgical History, Family, Social History, and ROS     Patient Active Problem List    Diagnosis    • *Pulmonary mass [R91.8]    • Jugular vein thrombosis [I82.890]    • SVC syndrome [I87.1]    • Renal mass [N28.89]    • High blood pressure [I10]    • Marijuana use [F12.90]    • Tobacco dependence [F17.200]    • Alcohol use [Z72.89]      Past Surgical History:   Procedure Laterality Date   • AXILLARY SURGERY      sweat glands removed as a child from axilla d/t barbed wire accident   • STOMACH SURGERY      \"hole in stomach\"       Allergies   Allergen Reactions   • Codeine Itching     No current facility-administered medications on file prior to encounter.     No current outpatient medications on file prior to encounter.     MEDICATION LIST AND ALLERGIES REVIEWED.    Family History   Family history unknown: Yes     Social History     Tobacco Use   • Smoking status: Current Every Day Smoker     " "Packs/day: 1.50     Years: 40.00     Pack years: 60.00     Types: Cigarettes   • Smokeless tobacco: Never Used   Substance Use Topics   • Alcohol use: Yes     Alcohol/week: 12.0 standard drinks     Types: 12 Cans of beer per week   • Drug use: Yes     Types: Marijuana     Social History     Social History Narrative   • Not on file     FAMILY AND SOCIAL HISTORY REVIEWED.    Review of Systems   Constitutional: Positive for activity change and fatigue. Negative for appetite change, chills and fever.   HENT: Positive for facial swelling. Negative for congestion, sore throat and voice change.    Eyes: Negative for photophobia and visual disturbance.   Respiratory: Positive for shortness of breath. Negative for cough and wheezing.    Cardiovascular: Positive for chest pain. Negative for palpitations and leg swelling.   Gastrointestinal: Negative for abdominal distention and abdominal pain.   Genitourinary: Negative for difficulty urinating and flank pain.   Musculoskeletal: Negative for myalgias and neck stiffness.   Skin: Negative for color change and rash.   Neurological: Negative for dizziness, seizures and headaches.   Hematological: Negative for adenopathy.   Psychiatric/Behavioral: Negative for agitation, hallucinations and sleep disturbance.     ALL OTHER SYSTEMS REVIEWED AND ARE NEGATIVE.    Physical Exam and Clinical Information   /100 (BP Location: Right arm, Patient Position: Lying)   Pulse 104   Temp 98 °F (36.7 °C) (Oral)   Resp 18   Ht 177.8 cm (70\")   Wt 77.6 kg (171 lb 1.6 oz)   SpO2 95%   BMI 24.55 kg/m²   Physical Exam  Vitals and nursing note reviewed.   Constitutional:       General: He is not in acute distress.     Appearance: He is well-developed and normal weight. He is not ill-appearing or toxic-appearing.   HENT:      Head: Normocephalic and atraumatic.      Right Ear: External ear normal.      Left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

## 2022-08-16 ENCOUNTER — ANESTHESIA (OUTPATIENT)
Dept: GASTROENTEROLOGY | Facility: HOSPITAL | Age: 57
End: 2022-08-16

## 2022-08-16 ENCOUNTER — APPOINTMENT (OUTPATIENT)
Dept: GENERAL RADIOLOGY | Facility: HOSPITAL | Age: 57
End: 2022-08-16

## 2022-08-16 LAB
ANION GAP SERPL CALCULATED.3IONS-SCNC: 12 MMOL/L (ref 5–15)
BASOPHILS # BLD AUTO: 0.01 10*3/MM3 (ref 0–0.2)
BASOPHILS # BLD AUTO: 0.03 10*3/MM3 (ref 0–0.2)
BASOPHILS NFR BLD AUTO: 0.1 % (ref 0–1.5)
BASOPHILS NFR BLD AUTO: 0.5 % (ref 0–1.5)
BUN SERPL-MCNC: 10 MG/DL (ref 6–20)
BUN/CREAT SERPL: 13.5 (ref 7–25)
CALCIUM SPEC-SCNC: 9.9 MG/DL (ref 8.6–10.5)
CHLORIDE SERPL-SCNC: 103 MMOL/L (ref 98–107)
CO2 SERPL-SCNC: 23 MMOL/L (ref 22–29)
CREAT SERPL-MCNC: 0.74 MG/DL (ref 0.76–1.27)
DEPRECATED RDW RBC AUTO: 41.4 FL (ref 37–54)
DEPRECATED RDW RBC AUTO: 41.9 FL (ref 37–54)
EGFRCR SERPLBLD CKD-EPI 2021: 105.7 ML/MIN/1.73
EOSINOPHIL # BLD AUTO: 0 10*3/MM3 (ref 0–0.4)
EOSINOPHIL # BLD AUTO: 0.11 10*3/MM3 (ref 0–0.4)
EOSINOPHIL NFR BLD AUTO: 0 % (ref 0.3–6.2)
EOSINOPHIL NFR BLD AUTO: 1.8 % (ref 0.3–6.2)
ERYTHROCYTE [DISTWIDTH] IN BLOOD BY AUTOMATED COUNT: 11.9 % (ref 12.3–15.4)
ERYTHROCYTE [DISTWIDTH] IN BLOOD BY AUTOMATED COUNT: 11.9 % (ref 12.3–15.4)
GLUCOSE SERPL-MCNC: 104 MG/DL (ref 65–99)
HCT VFR BLD AUTO: 38 % (ref 37.5–51)
HCT VFR BLD AUTO: 39.7 % (ref 37.5–51)
HGB BLD-MCNC: 12.8 G/DL (ref 13–17.7)
HGB BLD-MCNC: 13.3 G/DL (ref 13–17.7)
IMM GRANULOCYTES # BLD AUTO: 0.01 10*3/MM3 (ref 0–0.05)
IMM GRANULOCYTES # BLD AUTO: 0.02 10*3/MM3 (ref 0–0.05)
IMM GRANULOCYTES NFR BLD AUTO: 0.1 % (ref 0–0.5)
IMM GRANULOCYTES NFR BLD AUTO: 0.3 % (ref 0–0.5)
LYMPHOCYTES # BLD AUTO: 1.11 10*3/MM3 (ref 0.7–3.1)
LYMPHOCYTES # BLD AUTO: 1.16 10*3/MM3 (ref 0.7–3.1)
LYMPHOCYTES NFR BLD AUTO: 15.6 % (ref 19.6–45.3)
LYMPHOCYTES NFR BLD AUTO: 18.8 % (ref 19.6–45.3)
MAGNESIUM SERPL-MCNC: 2 MG/DL (ref 1.6–2.6)
MCH RBC QN AUTO: 32 PG (ref 26.6–33)
MCH RBC QN AUTO: 32.3 PG (ref 26.6–33)
MCHC RBC AUTO-ENTMCNC: 33.5 G/DL (ref 31.5–35.7)
MCHC RBC AUTO-ENTMCNC: 33.7 G/DL (ref 31.5–35.7)
MCV RBC AUTO: 95.4 FL (ref 79–97)
MCV RBC AUTO: 96 FL (ref 79–97)
MONOCYTES # BLD AUTO: 0.71 10*3/MM3 (ref 0.1–0.9)
MONOCYTES # BLD AUTO: 0.76 10*3/MM3 (ref 0.1–0.9)
MONOCYTES NFR BLD AUTO: 10.7 % (ref 5–12)
MONOCYTES NFR BLD AUTO: 11.5 % (ref 5–12)
NEUTROPHILS NFR BLD AUTO: 4.13 10*3/MM3 (ref 1.7–7)
NEUTROPHILS NFR BLD AUTO: 5.23 10*3/MM3 (ref 1.7–7)
NEUTROPHILS NFR BLD AUTO: 67.1 % (ref 42.7–76)
NEUTROPHILS NFR BLD AUTO: 73.5 % (ref 42.7–76)
NRBC BLD AUTO-RTO: 0 /100 WBC (ref 0–0.2)
NRBC BLD AUTO-RTO: 0 /100 WBC (ref 0–0.2)
PLATELET # BLD AUTO: 171 10*3/MM3 (ref 140–450)
PLATELET # BLD AUTO: 177 10*3/MM3 (ref 140–450)
PMV BLD AUTO: 10.4 FL (ref 6–12)
PMV BLD AUTO: 10.8 FL (ref 6–12)
POTASSIUM SERPL-SCNC: 4.1 MMOL/L (ref 3.5–5.2)
RBC # BLD AUTO: 3.96 10*6/MM3 (ref 4.14–5.8)
RBC # BLD AUTO: 4.16 10*6/MM3 (ref 4.14–5.8)
SODIUM SERPL-SCNC: 138 MMOL/L (ref 136–145)
WBC NRBC COR # BLD: 6.16 10*3/MM3 (ref 3.4–10.8)
WBC NRBC COR # BLD: 7.12 10*3/MM3 (ref 3.4–10.8)

## 2022-08-16 PROCEDURE — 87252 VIRUS INOCULATION TISSUE: CPT | Performed by: INTERNAL MEDICINE

## 2022-08-16 PROCEDURE — 0BDC8ZX EXTRACTION OF RIGHT UPPER LUNG LOBE, VIA NATURAL OR ARTIFICIAL OPENING ENDOSCOPIC, DIAGNOSTIC: ICD-10-PCS | Performed by: INTERNAL MEDICINE

## 2022-08-16 PROCEDURE — 87116 MYCOBACTERIA CULTURE: CPT | Performed by: INTERNAL MEDICINE

## 2022-08-16 PROCEDURE — 31623 DX BRONCHOSCOPE/BRUSH: CPT | Performed by: INTERNAL MEDICINE

## 2022-08-16 PROCEDURE — 87070 CULTURE OTHR SPECIMN AEROBIC: CPT | Performed by: INTERNAL MEDICINE

## 2022-08-16 PROCEDURE — 85520 HEPARIN ASSAY: CPT

## 2022-08-16 PROCEDURE — 31627 NAVIGATIONAL BRONCHOSCOPY: CPT | Performed by: INTERNAL MEDICINE

## 2022-08-16 PROCEDURE — 25010000002 FENTANYL CITRATE (PF) 50 MCG/ML SOLUTION: Performed by: NURSE ANESTHETIST, CERTIFIED REGISTERED

## 2022-08-16 PROCEDURE — 85025 COMPLETE CBC W/AUTO DIFF WBC: CPT | Performed by: INTERNAL MEDICINE

## 2022-08-16 PROCEDURE — 87186 SC STD MICRODIL/AGAR DIL: CPT | Performed by: INTERNAL MEDICINE

## 2022-08-16 PROCEDURE — C1726 CATH, BAL DIL, NON-VASCULAR: HCPCS | Performed by: INTERNAL MEDICINE

## 2022-08-16 PROCEDURE — 83735 ASSAY OF MAGNESIUM: CPT | Performed by: INTERNAL MEDICINE

## 2022-08-16 PROCEDURE — 99233 SBSQ HOSP IP/OBS HIGH 50: CPT | Performed by: INTERNAL MEDICINE

## 2022-08-16 PROCEDURE — 76000 FLUOROSCOPY <1 HR PHYS/QHP: CPT

## 2022-08-16 PROCEDURE — 93010 ELECTROCARDIOGRAM REPORT: CPT | Performed by: INTERNAL MEDICINE

## 2022-08-16 PROCEDURE — 93005 ELECTROCARDIOGRAM TRACING: CPT | Performed by: ANESTHESIOLOGY

## 2022-08-16 PROCEDURE — 31652 BRONCH EBUS SAMPLNG 1/2 NODE: CPT | Performed by: INTERNAL MEDICINE

## 2022-08-16 PROCEDURE — 87102 FUNGUS ISOLATION CULTURE: CPT | Performed by: INTERNAL MEDICINE

## 2022-08-16 PROCEDURE — 31628 BRONCHOSCOPY/LUNG BX EACH: CPT | Performed by: INTERNAL MEDICINE

## 2022-08-16 PROCEDURE — 25010000002 PROPOFOL 10 MG/ML EMULSION: Performed by: NURSE ANESTHETIST, CERTIFIED REGISTERED

## 2022-08-16 PROCEDURE — 87205 SMEAR GRAM STAIN: CPT | Performed by: INTERNAL MEDICINE

## 2022-08-16 PROCEDURE — 88305 TISSUE EXAM BY PATHOLOGIST: CPT | Performed by: INTERNAL MEDICINE

## 2022-08-16 PROCEDURE — 25010000002 ONDANSETRON PER 1 MG: Performed by: NURSE ANESTHETIST, CERTIFIED REGISTERED

## 2022-08-16 PROCEDURE — 87206 SMEAR FLUORESCENT/ACID STAI: CPT | Performed by: INTERNAL MEDICINE

## 2022-08-16 PROCEDURE — 0B9C8ZX DRAINAGE OF RIGHT UPPER LUNG LOBE, VIA NATURAL OR ARTIFICIAL OPENING ENDOSCOPIC, DIAGNOSTIC: ICD-10-PCS | Performed by: INTERNAL MEDICINE

## 2022-08-16 PROCEDURE — 25010000002 MORPHINE PER 10 MG: Performed by: INTERNAL MEDICINE

## 2022-08-16 PROCEDURE — 25010000002 DEXAMETHASONE PER 1 MG: Performed by: NURSE ANESTHETIST, CERTIFIED REGISTERED

## 2022-08-16 PROCEDURE — 31629 BRONCHOSCOPY/NEEDLE BX EACH: CPT | Performed by: INTERNAL MEDICINE

## 2022-08-16 PROCEDURE — 0BBC8ZX EXCISION OF RIGHT UPPER LUNG LOBE, VIA NATURAL OR ARTIFICIAL OPENING ENDOSCOPIC, DIAGNOSTIC: ICD-10-PCS | Performed by: INTERNAL MEDICINE

## 2022-08-16 PROCEDURE — 80048 BASIC METABOLIC PNL TOTAL CA: CPT | Performed by: INTERNAL MEDICINE

## 2022-08-16 PROCEDURE — 31624 DX BRONCHOSCOPE/LAVAGE: CPT | Performed by: INTERNAL MEDICINE

## 2022-08-16 PROCEDURE — 71045 X-RAY EXAM CHEST 1 VIEW: CPT

## 2022-08-16 RX ORDER — SODIUM CHLORIDE, SODIUM LACTATE, POTASSIUM CHLORIDE, CALCIUM CHLORIDE 600; 310; 30; 20 MG/100ML; MG/100ML; MG/100ML; MG/100ML
9 INJECTION, SOLUTION INTRAVENOUS CONTINUOUS
Status: DISCONTINUED | OUTPATIENT
Start: 2022-08-16 | End: 2022-08-17 | Stop reason: HOSPADM

## 2022-08-16 RX ORDER — ONDANSETRON 2 MG/ML
4 INJECTION INTRAMUSCULAR; INTRAVENOUS ONCE AS NEEDED
Status: COMPLETED | OUTPATIENT
Start: 2022-08-16 | End: 2022-08-16

## 2022-08-16 RX ORDER — IPRATROPIUM BROMIDE AND ALBUTEROL SULFATE 2.5; .5 MG/3ML; MG/3ML
3 SOLUTION RESPIRATORY (INHALATION) ONCE AS NEEDED
Status: DISCONTINUED | OUTPATIENT
Start: 2022-08-16 | End: 2022-08-16 | Stop reason: HOSPADM

## 2022-08-16 RX ORDER — DEXAMETHASONE SODIUM PHOSPHATE 4 MG/ML
INJECTION, SOLUTION INTRA-ARTICULAR; INTRALESIONAL; INTRAMUSCULAR; INTRAVENOUS; SOFT TISSUE AS NEEDED
Status: DISCONTINUED | OUTPATIENT
Start: 2022-08-16 | End: 2022-08-16 | Stop reason: SURG

## 2022-08-16 RX ORDER — ROCURONIUM BROMIDE 10 MG/ML
INJECTION, SOLUTION INTRAVENOUS AS NEEDED
Status: DISCONTINUED | OUTPATIENT
Start: 2022-08-16 | End: 2022-08-16 | Stop reason: SURG

## 2022-08-16 RX ORDER — SODIUM CHLORIDE 0.9 % (FLUSH) 0.9 %
10 SYRINGE (ML) INJECTION AS NEEDED
Status: DISCONTINUED | OUTPATIENT
Start: 2022-08-16 | End: 2022-08-16 | Stop reason: HOSPADM

## 2022-08-16 RX ORDER — LIDOCAINE HYDROCHLORIDE 10 MG/ML
INJECTION, SOLUTION EPIDURAL; INFILTRATION; INTRACAUDAL; PERINEURAL AS NEEDED
Status: DISCONTINUED | OUTPATIENT
Start: 2022-08-16 | End: 2022-08-16 | Stop reason: SURG

## 2022-08-16 RX ORDER — MIDAZOLAM HYDROCHLORIDE 1 MG/ML
1 INJECTION INTRAMUSCULAR; INTRAVENOUS
Status: DISCONTINUED | OUTPATIENT
Start: 2022-08-16 | End: 2022-08-16 | Stop reason: HOSPADM

## 2022-08-16 RX ORDER — PROPOFOL 10 MG/ML
VIAL (ML) INTRAVENOUS AS NEEDED
Status: DISCONTINUED | OUTPATIENT
Start: 2022-08-16 | End: 2022-08-16 | Stop reason: SURG

## 2022-08-16 RX ORDER — FENTANYL CITRATE 50 UG/ML
INJECTION, SOLUTION INTRAMUSCULAR; INTRAVENOUS AS NEEDED
Status: DISCONTINUED | OUTPATIENT
Start: 2022-08-16 | End: 2022-08-16 | Stop reason: SURG

## 2022-08-16 RX ADMIN — Medication 5 MG: at 20:13

## 2022-08-16 RX ADMIN — HYDROCODONE BITARTRATE AND ACETAMINOPHEN 1 TABLET: 10; 325 TABLET ORAL at 23:32

## 2022-08-16 RX ADMIN — THIAMINE HCL TAB 100 MG 100 MG: 100 TAB at 08:18

## 2022-08-16 RX ADMIN — DEXAMETHASONE SODIUM PHOSPHATE 8 MG: 4 INJECTION, SOLUTION INTRA-ARTICULAR; INTRALESIONAL; INTRAMUSCULAR; INTRAVENOUS; SOFT TISSUE at 11:08

## 2022-08-16 RX ADMIN — Medication 1 PATCH: at 20:12

## 2022-08-16 RX ADMIN — ROCURONIUM BROMIDE 50 MG: 50 INJECTION, SOLUTION INTRAVENOUS at 10:52

## 2022-08-16 RX ADMIN — HYDROCODONE BITARTRATE AND ACETAMINOPHEN 1 TABLET: 10; 325 TABLET ORAL at 08:18

## 2022-08-16 RX ADMIN — PROPOFOL 200 MG: 10 INJECTION, EMULSION INTRAVENOUS at 10:51

## 2022-08-16 RX ADMIN — SENNOSIDES AND DOCUSATE SODIUM 2 TABLET: 50; 8.6 TABLET ORAL at 20:13

## 2022-08-16 RX ADMIN — PANTOPRAZOLE SODIUM 40 MG: 40 TABLET, DELAYED RELEASE ORAL at 05:01

## 2022-08-16 RX ADMIN — HYDROCODONE BITARTRATE AND ACETAMINOPHEN 1 TABLET: 10; 325 TABLET ORAL at 16:22

## 2022-08-16 RX ADMIN — FOLIC ACID 1 MG: 1 TABLET ORAL at 08:18

## 2022-08-16 RX ADMIN — LIDOCAINE HYDROCHLORIDE 50 MG: 10 INJECTION, SOLUTION EPIDURAL; INFILTRATION; INTRACAUDAL; PERINEURAL at 10:51

## 2022-08-16 RX ADMIN — Medication 10 ML: at 20:15

## 2022-08-16 RX ADMIN — FENTANYL CITRATE 50 MCG: 50 INJECTION, SOLUTION INTRAMUSCULAR; INTRAVENOUS at 11:07

## 2022-08-16 RX ADMIN — MORPHINE SULFATE 2 MG: 2 INJECTION, SOLUTION INTRAMUSCULAR; INTRAVENOUS at 20:14

## 2022-08-16 RX ADMIN — ONDANSETRON 4 MG: 2 INJECTION INTRAMUSCULAR; INTRAVENOUS at 11:20

## 2022-08-16 RX ADMIN — Medication 10 ML: at 08:19

## 2022-08-16 RX ADMIN — SODIUM CHLORIDE, POTASSIUM CHLORIDE, SODIUM LACTATE AND CALCIUM CHLORIDE 9 ML/HR: 600; 310; 30; 20 INJECTION, SOLUTION INTRAVENOUS at 09:12

## 2022-08-16 RX ADMIN — FENTANYL CITRATE 50 MCG: 50 INJECTION, SOLUTION INTRAMUSCULAR; INTRAVENOUS at 11:05

## 2022-08-16 RX ADMIN — PROPOFOL 100 MG: 10 INJECTION, EMULSION INTRAVENOUS at 11:02

## 2022-08-16 RX ADMIN — MORPHINE SULFATE 2 MG: 2 INJECTION, SOLUTION INTRAMUSCULAR; INTRAVENOUS at 05:01

## 2022-08-16 RX ADMIN — MULTIVITAMIN TABLET 1 TABLET: TABLET at 08:18

## 2022-08-16 RX ADMIN — Medication 10 ML: at 09:13

## 2022-08-16 RX ADMIN — HYDROCODONE BITARTRATE AND ACETAMINOPHEN 1 TABLET: 10; 325 TABLET ORAL at 01:45

## 2022-08-16 RX ADMIN — SUGAMMADEX 200 MG: 100 INJECTION, SOLUTION INTRAVENOUS at 11:58

## 2022-08-16 NOTE — ANESTHESIA PREPROCEDURE EVALUATION
Anesthesia Evaluation     Patient summary reviewed and Nursing notes reviewed   NPO Solid Status: > 8 hours  NPO Liquid Status: > 8 hours           Airway   Mallampati: I  TM distance: >3 FB  Neck ROM: full  No difficulty expected  Dental    (+) edentulous    Pulmonary    (+) a smoker Current,   (-) COPD, asthma, shortness of breath, recent URI, sleep apnea    ROS comment: Pulm /Renal Mass    Malignant SVC syndrome with severe SVC stenosis  Rx : transfemoral venous SVC primary stenting/angioplasty   Restoration of SVC luminal patency with brisk flow, resolution of upstream collaterals and no extravasation.     Reported improvement in his SVC syndrome symptoms immediately after the SVC recanalization.  Cardiovascular     ECG reviewed  Patient on routine beta blocker    (+) hypertension,   (-) past MI, dysrhythmias, angina    ROS comment: ECG Sinus tachycardia  Otherwise normal ECG    Neuro/Psych  (-) seizures, CVA  GI/Hepatic/Renal/Endo    (+)   renal disease (renal/pulm mass ),   (-) diabetes, no thyroid disorder    Musculoskeletal     Abdominal    Substance History      OB/GYN          Other        ROS/Med Hx Other: SVC syndrome stented yesterday                Anesthesia Plan    ASA 4     general     intravenous induction     Anesthetic plan, risks, benefits, and alternatives have been provided, discussed and informed consent has been obtained with: patient.    Plan discussed with CRNA.        CODE STATUS:    Code Status (Patient has no pulse and is not breathing): CPR (Attempt to Resuscitate)  Medical Interventions (Patient has pulse or is breathing): Full Support

## 2022-08-16 NOTE — PROGRESS NOTES
HEPARIN INFUSION    Mitesh Molina is a 57 y.o. male receiving heparin infusion.    Therapy for (VTE/Cardiac): VTE  Patient Weight: 77.6 kg  Initial Bolus (Y/N): No (heparin drip started at OSH)  Any Bolus (Y/N): Yes     Signs or Symptoms of Bleeding: none noted    VTE (PE/DVT)   Initial Bolus: 80 units/kg (Max 10,000 units)  Initial rate: 18 units/kg/hr (Max 1,500 units/hr)    Anti Xa Rebolus Infusion Hold time Change infusion Dose   (Units/kg/hr) Next Anti Xa Level Due   < 0.11 50 Units/kg   (4000 Units Max) None  Increase by  4 Units/kg/hr 6 hours   0.11 - 0.19 25 Units/kg   (2000 Units Max) None  Increase by  3 Units/kg/hr 6 hours   0.2 - 0.29 0 None  Increase by  2 Units/kg/hr 6 hours   0.3 - 0.7 0 None  No Change 6 hours   (after 2 consecutive  levels in range check  qAM)   0.71 - 0.8 0 None  Decrease by  1 Units/kg/hr 6 hours   0.81 - 0.9 0 None Decrease by  2 Units/kg/hr 6 hours   0.91 - 1 0 60 Minutes  Decrease by  3 Units/kg/hr 6 hours   >1 0 Hold  After Anti Xa < 0.7   decrease old rate by  4 Units/kg/hr  Every 2 hours until Anti Xa < 0.7  then when infusion restarts in 6 hours     Results from last 7 days   Lab Units 08/16/22  0700 08/15/22  0227 08/14/22  0457 08/13/22  2310   INR   --   --   --  1.00   HEMOGLOBIN g/dL 13.3 13.1 13.3 13.5   HEMATOCRIT % 39.7 39.0 39.6 40.4   PLATELETS 10*3/mm3 171 178 183 178       Date   Time   Anti-Xa Current Rate (Unit/kg/hr) Bolus   (Units) Rate Change   (Unit/kg/hr) New Rate (Unit/kg/hr) Next anti-Xa Comments  Pump Check Daily   8/13 2315 -- 0 -- +18 18 0500 Heparin drip/bolus started at OSH at ~19:40.  Heparin drip has been off for ~50 minutes.  Resume heparin drip and re-check anti-Xa in ~10 hours   8/14 0457 0.1 18 3800 +4 22 1400 D/w KEMI Shah   8/14 1643 0.1 22 3900 +4 26 0200 Spoke w/RN   8/15 0227 0.34 26 -- -- 26 0900 D/w RN   8/15 0938 0.26 26 -- +2 28 1500 DW RN   8/15 1805 0.1 off -- -- 28 -- Off since ~1200 for procedure; resumed at previous rate    8/16 0000 -- 28 -- -- OFF -- Off at Midnight per order   8/16 1600 -- off -- -- 28 2200 Ok to resume w/o bolus per Dr. Larson post bronch; d/w RN                                                                                                                                                    Nehemiah Castro, PharmD, BCPS  8/16/2022  16:29 EDT

## 2022-08-16 NOTE — ANESTHESIA POSTPROCEDURE EVALUATION
Patient: Mitesh Molina    Procedure Summary     Date: 08/16/22 Room / Location:  [facility] /  [facility]    Anesthesia Start: 1041 Anesthesia Stop: 1211    Procedure: BRONCHOSCOPY WITH ENDOBRONCHIAL ULTRASOUND WITH NAVIGATION (N/A Bronchus) Diagnosis:     Surgeons: Laci Larson DO Provider: Rhett Escalante MD    Anesthesia Type: general ASA Status: 4          Anesthesia Type: general    Vitals  Vitals Value Taken Time   /81 08/16/22 1211   Temp 97 °F (36.1 °C) 08/16/22 1211   Pulse 116 08/16/22 1211   Resp 18 08/16/22 1211   SpO2 98 % 08/16/22 1211           Post Anesthesia Care and Evaluation    Patient location during evaluation: PACU  Patient participation: complete - patient participated  Level of consciousness: sleepy but conscious  Pain score: 0  Pain management: adequate    Airway patency: patent  Anesthetic complications: No anesthetic complications  PONV Status: none  Cardiovascular status: stable  Respiratory status: spontaneous ventilation and nasal cannula  Hydration status: acceptable  No anesthesia care post op

## 2022-08-16 NOTE — PROGRESS NOTES
[facility] Medicine Services  PROGRESS NOTE    Patient Name: Mitesh Molina  : 1965  MRN: [id number]    Date of Admission: 2022  Primary Care Physician: Christophe Garcia APRN    Subjective   Subjective     CC: Follow-up SVC syndrome    HPI:No acute events overnight, patient states that he feels much better this morning, he is s/p bronch with biopsy     ROS:  Gen- No fevers, chills  CV- No chest pain, palpitations  Resp- No cough, dyspnea  GI- No N/V/D, abd pain      Objective   Objective     Vital Signs:   Temp:  [98 °F (36.7 °C)-98.7 °F (37.1 °C)] 98.5 °F (36.9 °C)  Heart Rate:  [] 101  Resp:  [12-18] 17  BP: (116-152)/() 119/76     Physical Exam:  Constitutional: No acute distress, awake, alert  HENT: NCAT, mucous membranes moist  Respiratory: Clear to auscultation bilaterally, respiratory effort normal   Cardiovascular: RRR, no murmurs, rubs, or gallops  Gastrointestinal: Positive bowel sounds, soft, nontender, nondistended  Musculoskeletal: No bilateral ankle edema  Psychiatric: Appropriate affect, cooperative  Neurologic: Oriented x 3, nonfocal  Skin: No rashes      Results Reviewed:  LAB RESULTS:      Lab 08/15/22  1805 08/15/22  0938 08/15/22  0227 22  1643 22  0457 22  2310   WBC  --   --  6.04  --  6.29 6.23   HEMOGLOBIN  --   --  13.1  --  13.3 13.5   HEMATOCRIT  --   --  39.0  --  39.6 40.4   PLATELETS  --   --  178  --  183 178   NEUTROS ABS  --   --  3.74  --  4.11 3.94   IMMATURE GRANS (ABS)  --   --  0.02  --  0.02 0.01   LYMPHS ABS  --   --  1.53  --  1.39 1.53   MONOS ABS  --   --  0.61  --  0.64 0.65   EOS ABS  --   --  0.11  --  0.10 0.08   MCV  --   --  96.1  --  97.3* 96.9   PROCALCITONIN  --   --   --   --   --  0.04   LACTATE  --   --   --   --   --  0.9   PROTIME  --   --   --   --   --  13.1   APTT  --   --   --   --  37.5*  --    HEPARIN ANTI-XA 0.10* 0.26* 0.34 0.10* 0.10*  --          Lab 08/15/22  0227  08/14/22 0457 08/13/22 2310   SODIUM 138 137 139   POTASSIUM 4.0 3.8 3.8   CHLORIDE 104 104 104   CO2 23.0 23.0 26.0   ANION GAP 11.0 10.0 9.0   BUN 9 7 5*   CREATININE 0.66* 0.58* 0.59*   EGFR 109.4 113.8 113.2   GLUCOSE 110* 98 91   CALCIUM 9.5 9.5 9.9   MAGNESIUM 2.0 2.1  --          Lab 08/15/22  0227 08/13/22 2310   TOTAL PROTEIN 7.4 7.5   ALBUMIN 3.50 3.60   GLOBULIN 3.9 3.9   ALT (SGPT) 6 6   AST (SGOT) 8 9   BILIRUBIN 0.2 0.3   ALK PHOS 69 73         Lab 08/13/22 2310   PROTIME 13.1   INR 1.00             Lab 08/14/22 0457 08/13/22 2310   ABO TYPING O O   RH TYPING Negative Negative   ANTIBODY SCREEN  --  Negative         Brief Urine Lab Results     None          Microbiology Results Abnormal     Procedure Component Value - Date/Time    Blood Culture - Blood, Arm, Right [[id number]]  (Normal) Collected: 08/13/22 2310    Lab Status: Preliminary result Specimen: Blood from Arm, Right Updated: 08/15/22 0704     Blood Culture No growth at 24 hours    Blood Culture - Blood, Arm, Left [[id number]]  (Normal) Collected: 08/13/22 2310    Lab Status: Preliminary result Specimen: Blood from Arm, Left Updated: 08/15/22 0704     Blood Culture No growth at 24 hours    COVID PRE-OP / PRE-PROCEDURE SCREENING ORDER (NO ISOLATION) - Swab, Nasopharynx [[id number]]  (Normal) Collected: 08/13/22 2334    Lab Status: Final result Specimen: Swab from Nasopharynx Updated: 08/14/22 0032    Narrative:      The following orders were created for panel order COVID PRE-OP / PRE-PROCEDURE SCREENING ORDER (NO ISOLATION) - Swab, Nasopharynx.  Procedure                               Abnormality         Status                     ---------                               -----------         ------                     Respiratory Panel PCR w/...[[id number]]  Normal              Final result                 Please view results for these tests on the individual orders.    Respiratory Panel PCR w/COVID-19(SARS-CoV-2) KELY/EDI/TERI/PAD/COR/MAD/MONTY  In-House, NP Swab in [facility], 3-4 HR TAT - Swab, Nasopharynx [[id number]]  (Normal) Collected: 08/13/22 2334    Lab Status: Final result Specimen: Swab from Nasopharynx Updated: 08/14/22 0032     ADENOVIRUS, PCR Not Detected     Coronavirus 229E Not Detected     Coronavirus HKU1 Not Detected     Coronavirus NL63 Not Detected     Coronavirus OC43 Not Detected     COVID19 Not Detected     Human Metapneumovirus Not Detected     Human Rhinovirus/Enterovirus Not Detected     Influenza A PCR Not Detected     Influenza B PCR Not Detected     Parainfluenza Virus 1 Not Detected     Parainfluenza Virus 2 Not Detected     Parainfluenza Virus 3 Not Detected     Parainfluenza Virus 4 Not Detected     RSV, PCR Not Detected     Bordetella pertussis pcr Not Detected     Bordetella parapertussis PCR Not Detected     Chlamydophila pneumoniae PCR Not Detected     Mycoplasma pneumo by PCR Not Detected    Narrative:      In the setting of a positive respiratory panel with a viral infection PLUS a negative procalcitonin without other underlying concern for bacterial infection, consider observing off antibiotics or discontinuation of antibiotics and continue supportive care. If the respiratory panel is positive for atypical bacterial infection (Bordetella pertussis, Chlamydophila pneumoniae, or Mycoplasma pneumoniae), consider antibiotic de-escalation to target atypical bacterial infection.          CT Chest Without Contrast Diagnostic    Result Date: 8/15/2022  CT CHEST WO CONTRAST DIAGNOSTIC-  Date of Exam: 8/15/2022 2:04 PM  Indication: Lung nodule, > 8mm.  Comparison: CT 08/13/2022  Technique: Serial and axial CT images of the chest were obtained. Reconstructions in the coronal and sagittal planes were performed. Automated exposure control and iterative reconstruction methods were used.  FINDINGS:  Hilum and Mediastinum: There is a conglomerate of lymph node seen within the right paratracheal region measuring up to approximately 6.8 x  4.4 cm (series 2 image 30). Evaluation is limited due to lack of intravenous contrast. Multiple small nodes are present within the prevascular region as well as the superior mediastinum. No definite left hilar adenopathy identified. Normal heart size. No pericardial effusion.  Unremarkable thoracic aorta and pulmonary arteries. Atherosclerotic calcifications are present. Granulomatous calcifications are present.  Lung Parenchyma and Pleura: There is a mass present within the right upper lobe measuring up to 5.4 x 3.0 cm (series 4 image 26). A contiguous satellite nodules present laterally measuring up to 2.1 cm (series 4 image 25). The mass appears to extend to the right suprahilar region measuring proxy 6.8 cm in oblique dimension (series 900 image 30). There is a small subpleural cavitary nodule present within the anterior left lower lobe measuring up to 2.4 x 1.3 cm (series 4 image 59). A tiny subpleural nodules present within the left upper lobe measuring up to 6 mm (series 4 image 24). An additional subpleural cavitary lesion is present within the left upper lobe measuring up to 1.9 cm (series 4 image 39). A subpleural pulmonary nodule is present within the left upper lobe measuring up to 7 mm (series 4 image 54). No focal consolidations. No pleural effusions. No additional pulmonary nodule or mass identified. Mild atelectatic changes and nodularity is present within the subpleural right lower lobe with no evidence of additional dominant nodule which may be related to subsegmental atelectasis.  Upper Abdomen: No acute process identified. The adrenal glands appear unremarkable.  Soft tissues: Unremarkable.  Osseous structures: No aggressive focal lytic or sclerotic osseous lesions. Degenerative changes are present within the spine.      Impression:  1. Large right upper lobe mass with contiguous satellite lesion extending to the right suprahilar region with large conglomerate of right paratracheal nodes likely  representing primary malignancy and local metastatic disease. 2. Small cavitary lesions present within the left upper lobe and the left lower lobe which are concerning for second primary malignancies. Given the constellation of findings, an atypical infectious process is also in the differential though less likely particularly given the masslike right paratracheal adenopathy. PET/CT evaluation may be helpful of these findings are related to previous scarring. If hypermetabolic, this would be equivocal for infectious/inflammatory process or malignancy. 3. Multiple small subcentimeter pulmonary nodules as above which are indeterminate.     This report was finalized on 8/15/2022 2:40 PM by Rosalee Trevino MD.      IR Vein Stent Placement    Result Date: 8/15/2022  IR VEIN STENT PLACEMENT-                                                         History: SVC syndrome. Bulky mediastinal lymphadenopathy with a lung mass.    : John Pradhan M.D.                                                                            Modality: Sonography and fluoroscopy.                                 DOSE REDUCTION: The examination was performed according to departmental dose-optimization program.  Fluoro time: 5.1 minutes.  Radiation dose: 47.5 mGy air Kerma.                                                                              SEDATION: Moderate sedation was administered. 2 milligram of Versed and 100 micrograms of fentanyl IV was used for moderate sedation monitored under my direction. Total intra service time of sedation was 30 minutes. The patient's vital signs were monitored throughout the procedure and recorded in the patient's medical record by the nurse.  Anesthesia: Lidocaine, local infiltration    Medicines: Not applicable  Contrast medium: Isovue 300, 25 cc.  Estimated blood loss:  < 5 cc.          Technique:  A thorough discussion of the risks, benefits, and alternatives of the procedure, and if  applicable, moderate sedation, was carried out with the patient. They were encouraged to ask any questions. Any questions were answered. They verbalized understanding. A written informed consent was then signed.   A multi-component timeout was performed prior to starting the procedure using the departmental protocol.  The procedure room personnel used personal protective equipment. The operators used sterile gloves and if indicated, sterile gowns. The surgical site was prepped with chlorhexidine gluconate  and draped in the maximal applicable sterile fashion.  The patient was laid supine on the procedure table. The right common femoral vein access site was prepped with chlorhexidine gluconate and draped in the standard sterile fashion.  The right common femoral vein was localized using anatomic and ultrasonographic landmarks. The vein is patent. Using aseptic precautions, real-time ultrasonographic guidance, after local anesthesia and dermatotomy, the vein was accessed with an access needle. Eventually a guide wire was placed using standard exchanges. Over the wire an 8 Afghan Long vascular sheath was placed.  The combination of a Bentson wire and the sheath was used to advance the guidewire across the area SVC stenosis without difficulty. Over the wire, the sheath with the dilator was advanced to the most proximal segment of the right innominate vein. The wire was exchanged for a superstiff exchange length Amplatz wire. The sheath was used to perform an SVC gram. There was severe stenosis of the SVC measuring approximately 4 cm in length starting at or just beyond the left innominate vein junction. The central segment of the left innominate vein was also severely stenotic. 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 There was dilatation of the caudal aspect of the right internal jugular vein. There was filling of upstream collaterals from the jugular vein.  Over the wire,  a 10 mm x 60 mm self-expanding stent was positioned across the stenosis extending from the caudal most aspect of the right innominate vein to the mid SVC covering the area of stenosis with an extension beyond the stenosis of approximately 1.5 cm each proximally and distally. The stent was successfully deployed and then dilated with a 10 mm diameter balloon successfully.  A final angiogram in 2 different obliquities from the catheter positioned in the most peripheral right innominate vein showed brisk flow across the SVC with decompression of the subclavian and internal jugular veins and resolution of the jugular collaterals. There was no extravasation.  The catheter and sheath were withdrawn and hemostasis was secured by manual compression. An aseptic dressing was applied.  The patient was transferred to the recovery area and discharged from the department in stable condition.  Complications: None immediate.     Findings: Described above.  Device: Epic self-expanding 6 Romanian 10 mm x 60 mm bare-metal stent.      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 There was restoration of SVC luminal patency with brisk flow, resolution of upstream collaterals and no extravasation.  The patient reported improvement in his SVC syndrome symptoms immediately after the SVC recanalization.  Thank you for the opportunity to assist in the care of your patient.  This report was finalized on 8/15/2022 3:51 PM by John Pradhan MD.            I have reviewed the medications:  Scheduled Meds:thiamine, 100 mg, Oral, Daily   And  multivitamin, 1 tablet, Oral, Daily   And  folic acid, 1 mg, Oral, Daily  nicotine, 1 patch, Transdermal, Q24H  pantoprazole, 40 mg, Oral, Q AM  senna-docusate  sodium, 2 tablet, Oral, BID  sodium chloride, 10 mL, Intravenous, Q12H      Continuous Infusions:heparin, 28 Units/kg/hr, Last Rate: Stopped (08/16/22 0000)  Pharmacy to Dose Heparin,       PRN Meds:.•  acetaminophen **OR** acetaminophen **OR** acetaminophen  •  senna-docusate sodium **AND** polyethylene glycol **AND** bisacodyl **AND** bisacodyl  •  LORazepam **OR** diazePAM **OR** LORazepam **OR** diazePAM **OR** diazePAM  •  HYDROcodone-acetaminophen  •  melatonin  •  Morphine  •  ondansetron  •  Pharmacy to Dose Heparin  •  sodium chloride    Assessment & Plan   Assessment & Plan     Active Hospital Problems    Diagnosis  POA   • **Pulmonary mass [R91.8]  Yes   • Jugular vein thrombosis [I82.890]  Yes   • SVC syndrome [I87.1]  Yes   • Renal mass [N28.89]  Yes   • High blood pressure [I10]  Yes   • Marijuana use [F12.90]  Yes   • Tobacco dependence [F17.200]  Yes   • Alcohol use [Z72.89]  Yes      Resolved Hospital Problems   No resolved problems to display.        Brief Hospital Course to date:  Mitesh Molina is a 57 y.o. male with PMH of HTN, tobacco/THC use, ETOH abuse transferred from [facility] for SVC syndrome. Imaging there showed right renal mass, RUL pulmonary mass, numerous pulm nodules. And mediastinal adenopathy. CT neck shoed thrombus in both jugular veins, left greater than right with superior vena cava occlusion.     This patient's problems and plans were partially entered by my partner and updated as appropriate by me 08/16/22.     SVC syndrome w/Jugular Vein Thrombosis  -He is is status post stent placement by IR 8/15/2022  -Currently on heparin gtt., transition to NOAC at discharge  -Continue pain control     Large RUL Mass - Likely Lung Primary  -Pulmonary following, s/p bronchoscopy with biopsy today 8/16/2022, discussed with Dr. Larson  -Oncology following, seen by Dr. Hinojosa,  if small cell may go ahead and start chemotherapy while inpatient  - Rad Onc consulted, likely palliative  radiation to begin in [place] where he lives  - MRI brain with no evidence of metastatic disease     Right Renal Mass:  - possibly a secondary malignancy, renal cell carcinoma     Elevated Blood Pressure   - not on BP medications at home, likely driven by pain     Tobacco / Marijuana dependence  - nicotine patch     Alcohol use  - denies h/o withdrawal, drinking 6 pack of beer on average twice weekly  - CIWA protocol w/ PRN oral ativan/IV valium  - thiamine / folic acid / MVI  - seizure precautions     Expected Discharge Location and Transportation: Home  Expected Discharge Date: 8/18    DVT prophylaxis:  Medical and mechanical DVT prophylaxis orders are present.     AM-PAC 6 Clicks Score (PT): 24 (08/15/22 6307)    CODE STATUS:   Code Status and Medical Interventions:   Ordered at: 08/14/22 0011     Code Status (Patient has no pulse and is not breathing):    CPR (Attempt to Resuscitate)     Medical Interventions (Patient has pulse or is breathing):    Full Support       Roger Escobar MD  08/16/22

## 2022-08-16 NOTE — ANESTHESIA PROCEDURE NOTES
Airway  Urgency: elective    Date/Time: 8/16/2022 10:53 AM  Airway not difficult    General Information and Staff    Patient location during procedure: OR  CRNA/CAA: Arabella Burks CRNA    Indications and Patient Condition  Indications for airway management: airway protection    Preoxygenated: yes  MILS not maintained throughout  Mask difficulty assessment: 1 - vent by mask    Final Airway Details  Final airway type: endotracheal airway      Successful airway: ETT  Cuffed: yes   Successful intubation technique: direct laryngoscopy  Endotracheal tube insertion site: oral  Blade: Jayjay  Blade size: 4  ETT size (mm): 8.5  Cormack-Lehane Classification: grade I - full view of glottis  Placement verified by: chest auscultation and capnometry   Measured from: lips  ETT/EBT  to lips (cm): 22  Number of attempts at approach: 1  Assessment: lips, teeth, and gum same as pre-op and atraumatic intubation    Additional Comments  Negative epigastric sounds, Breath sound equal bilaterally with symmetric chest rise and fall. 8.5  ETT used per Dr Larson order.

## 2022-08-17 ENCOUNTER — APPOINTMENT (OUTPATIENT)
Dept: CT IMAGING | Facility: HOSPITAL | Age: 57
End: 2022-08-17

## 2022-08-17 VITALS
BODY MASS INDEX: 24.5 KG/M2 | TEMPERATURE: 98.5 F | DIASTOLIC BLOOD PRESSURE: 91 MMHG | SYSTOLIC BLOOD PRESSURE: 141 MMHG | OXYGEN SATURATION: 97 % | RESPIRATION RATE: 18 BRPM | HEIGHT: 70 IN | WEIGHT: 171.1 LBS | HEART RATE: 96 BPM

## 2022-08-17 LAB
CYTO UR: NORMAL
GIE STN SPEC: NORMAL
LAB AP CASE REPORT: NORMAL
LAB AP CLINICAL INFORMATION: NORMAL
LAB AP DIAGNOSIS COMMENT: NORMAL
PATH REPORT.FINAL DX SPEC: NORMAL
PATH REPORT.GROSS SPEC: NORMAL
UFH PPP CHRO-ACNC: 0.32 IU/ML (ref 0.3–0.7)
UFH PPP CHRO-ACNC: 0.4 IU/ML (ref 0.3–0.7)

## 2022-08-17 PROCEDURE — 0 IOPAMIDOL PER 1 ML: Performed by: INTERNAL MEDICINE

## 2022-08-17 PROCEDURE — 25010000002 HEPARIN (PORCINE) 25000-0.45 UT/250ML-% SOLUTION

## 2022-08-17 PROCEDURE — 97161 PT EVAL LOW COMPLEX 20 MIN: CPT

## 2022-08-17 PROCEDURE — 85520 HEPARIN ASSAY: CPT

## 2022-08-17 PROCEDURE — 74178 CT ABD&PLV WO CNTR FLWD CNTR: CPT

## 2022-08-17 PROCEDURE — 99232 SBSQ HOSP IP/OBS MODERATE 35: CPT | Performed by: INTERNAL MEDICINE

## 2022-08-17 PROCEDURE — 25010000002 MORPHINE PER 10 MG: Performed by: INTERNAL MEDICINE

## 2022-08-17 PROCEDURE — 99239 HOSP IP/OBS DSCHRG MGMT >30: CPT | Performed by: INTERNAL MEDICINE

## 2022-08-17 RX ADMIN — MORPHINE SULFATE 2 MG: 2 INJECTION, SOLUTION INTRAMUSCULAR; INTRAVENOUS at 04:01

## 2022-08-17 RX ADMIN — HYDROCODONE BITARTRATE AND ACETAMINOPHEN 1 TABLET: 10; 325 TABLET ORAL at 06:56

## 2022-08-17 RX ADMIN — PANTOPRAZOLE SODIUM 40 MG: 40 TABLET, DELAYED RELEASE ORAL at 05:11

## 2022-08-17 RX ADMIN — MORPHINE SULFATE 2 MG: 2 INJECTION, SOLUTION INTRAMUSCULAR; INTRAVENOUS at 10:54

## 2022-08-17 RX ADMIN — IOPAMIDOL 85 ML: 755 INJECTION, SOLUTION INTRAVENOUS at 13:20

## 2022-08-17 RX ADMIN — HEPARIN SODIUM 28 UNITS/KG/HR: 10000 INJECTION, SOLUTION INTRAVENOUS at 00:49

## 2022-08-17 RX ADMIN — Medication 10 ML: at 08:59

## 2022-08-17 RX ADMIN — APIXABAN 10 MG: 5 TABLET, FILM COATED ORAL at 11:33

## 2022-08-17 NOTE — CASE MANAGEMENT/SOCIAL WORK
Case Management Discharge Note      Final Note: CM attempted to see pt prior to d/c, however on phone & family @ bedside. Pt discharged.         Selected Continued Care - Discharged on 8/17/2022 Admission date: 8/13/2022 - Discharge disposition: Home or Self Care    Destination    No services have been selected for the patient.              Durable Medical Equipment    No services have been selected for the patient.              Dialysis/Infusion    No services have been selected for the patient.              Home Medical Care    No services have been selected for the patient.              Therapy    No services have been selected for the patient.              Community Resources    No services have been selected for the patient.              Community & DME    No services have been selected for the patient.                       Final Discharge Disposition Code: 01 - home or self-care

## 2022-08-17 NOTE — PROGRESS NOTES
PROGRESS NOTE       BRIEF HISTORY:  Mitesh Molina is a very pleasant 57 y.o. male with SVC syndrome secondary to a lung malignancy, who was initially seen in inpatient consult 8/15/2022 with Dr. Raman.  MRI brain 8/15/2022 was negative for evidence of intracranial metastatic disease.  He has since undergone SVC primary stenting on 8/15/2022 with Dr. Pradhan in IR.  On 8/16/2022 he was taken for bronchoscopy with ultrasound-guided biopsy with Dr. Larson.  Pathology and cytology are still pending.  The patient reports he is feeling better.  He is breathing better and feels his facial swelling is improved.  He reports he no longer feels like his neck and chest are being strangled.  He is hoping to go home today after a CT scan of his abdomen and pelvis.     Review of Systems - Oncology   Gen- No fevers, chills  CV- No chest pain, palpitations  Resp- No cough, dyspnea  GI- No N/V/D, abd pain       Objective   VITAL SIGNS:   Vitals:    08/17/22 0400 08/17/22 0500 08/17/22 0600 08/17/22 0734   BP:    141/91   BP Location:    Right arm   Patient Position:    Lying   Pulse: 106 99 101    Resp:    18   Temp:    98.5 °F (36.9 °C)   TempSrc:    Oral   SpO2: 93% 93% 94%    Weight:       Height:                  KPS 80%    Physical Exam  Constitutional: No acute distress, awake, alert, conversant  HENT: NCAT, mucous membranes moist  Respiratory: Clear to auscultation bilaterally, respiratory effort normal   Cardiovascular: RRR, no murmurs, rubs, or gallops  Gastrointestinal: soft, nontender, nondistended  Musculoskeletal: No bilateral ankle edema  Psychiatric: Appropriate affect, cooperative  Neurologic: Oriented x 3, speech is clear  Skin: No rashes       The following portions of the patient's history were reviewed and updated as appropriate: allergies, current medications, past family history, past medical history, past social history, past surgical history and problem list.    Assessment      IMPRESSION:   Continue to  await pathologic diagnosis.  Patient is improved clinically since SVC stent placement and he remains on Heparin gtt at this time with plans to transition to NOAC once evaluated by urology.  Patient has been seen by Dr. Hinojosa.  Assuming patient is discharged later today or prior to return of final path, he will need medical oncology and radiation oncology referrals in [place] which is closer to his home.  Of note, patient also has a right renal mass suspicious for separate primary renal cell carcinoma, with plans for CT abdomen/pelvis and subsequent recommendations per Dr. Vargas.       RECOMMENDATIONS:  Rad Onc will continue to follow while patient remains admitted.          Lita Hernandez, APRN

## 2022-08-17 NOTE — PROGRESS NOTES
[facility] Medicine Services  PROGRESS NOTE    Patient Name: Mitesh Molina  : 1965  MRN: [id number]    Date of Admission: 2022  Primary Care Physician: Christophe Garcia APRN    Subjective   Subjective     CC: Follow-up SVC syndrome, lung mass    HPI: No acute events overnight, patient said he rested well, has no new complaints    ROS:  Gen- No fevers, chills  CV- No chest pain, palpitations  Resp- No cough, dyspnea  GI- No N/V/D, abd pain      Objective   Objective     Vital Signs:   Temp:  [97 °F (36.1 °C)-98.7 °F (37.1 °C)] 98.7 °F (37.1 °C)  Heart Rate:  [] 101  Resp:  [16-20] 18  BP: (118-146)/(78-99) 146/99     Physical Exam:  Constitutional: No acute distress, awake, alert  HENT: NCAT, mucous membranes moist  Respiratory: Clear to auscultation bilaterally, respiratory effort normal   Cardiovascular: RRR, no murmurs, rubs, or gallops  Gastrointestinal: Positive bowel sounds, soft, nontender, nondistended  Musculoskeletal: No bilateral ankle edema  Psychiatric: Appropriate affect, cooperative  Neurologic: Oriented x 3, nonfocal  Skin: No rashes    Results Reviewed:  LAB RESULTS:      Lab 22  0628 22  2311 22  0700 08/15/22  1805 08/15/22  0938 08/15/22  0227 22  1643 22  0457 22  2310   WBC  --  7.12 6.16  --   --  6.04  --  6.29 6.23   HEMOGLOBIN  --  12.8* 13.3  --   --  13.1  --  13.3 13.5   HEMATOCRIT  --  38.0 39.7  --   --  39.0  --  39.6 40.4   PLATELETS  --  177 171  --   --  178  --  183 178   NEUTROS ABS  --  5.23 4.13  --   --  3.74  --  4.11 3.94   IMMATURE GRANS (ABS)  --  0.01 0.02  --   --  0.02  --  0.02 0.01   LYMPHS ABS  --  1.11 1.16  --   --  1.53  --  1.39 1.53   MONOS ABS  --  0.76 0.71  --   --  0.61  --  0.64 0.65   EOS ABS  --  0.00 0.11  --   --  0.11  --  0.10 0.08   MCV  --  96.0 95.4  --   --  96.1  --  97.3* 96.9   PROCALCITONIN  --   --   --   --   --   --   --   --  0.04   LACTATE  --   --    --   --   --   --   --   --  0.9   PROTIME  --   --   --   --   --   --   --   --  13.1   APTT  --   --   --   --   --   --   --  37.5*  --    HEPARIN ANTI-XA 0.40 0.32  --  0.10* 0.26* 0.34   < > 0.10*  --     < > = values in this interval not displayed.         Lab 08/16/22  0700 08/15/22  0227 08/14/22  0457 08/13/22  2310   SODIUM 138 138 137 139   POTASSIUM 4.1 4.0 3.8 3.8   CHLORIDE 103 104 104 104   CO2 23.0 23.0 23.0 26.0   ANION GAP 12.0 11.0 10.0 9.0   BUN 10 9 7 5*   CREATININE 0.74* 0.66* 0.58* 0.59*   EGFR 105.7 109.4 113.8 113.2   GLUCOSE 104* 110* 98 91   CALCIUM 9.9 9.5 9.5 9.9   MAGNESIUM 2.0 2.0 2.1  --          Lab 08/15/22  0227 08/13/22  2310   TOTAL PROTEIN 7.4 7.5   ALBUMIN 3.50 3.60   GLOBULIN 3.9 3.9   ALT (SGPT) 6 6   AST (SGOT) 8 9   BILIRUBIN 0.2 0.3   ALK PHOS 69 73         Lab 08/13/22 2310   PROTIME 13.1   INR 1.00             Lab 08/14/22 0457 08/13/22 2310   ABO TYPING O O   RH TYPING Negative Negative   ANTIBODY SCREEN  --  Negative         Brief Urine Lab Results     None          Microbiology Results Abnormal     Procedure Component Value - Date/Time    Blood Culture - Blood, Arm, Left [[id number]]  (Normal) Collected: 08/13/22 2310    Lab Status: Preliminary result Specimen: Blood from Arm, Left Updated: 08/17/22 0704     Blood Culture No growth at 3 days    Blood Culture - Blood, Arm, Right [[id number]]  (Normal) Collected: 08/13/22 2310    Lab Status: Preliminary result Specimen: Blood from Arm, Right Updated: 08/17/22 0703     Blood Culture No growth at 3 days    Respiratory Culture - Lavage, Lung, Right Upper Lobe [[id number]] Collected: 08/16/22 1155    Lab Status: Preliminary result Specimen: Lavage from Lung, Right Upper Lobe Updated: 08/16/22 142     Gram Stain Few (2+) Red blood cells      Rare (1+) WBCs seen      No organisms seen    COVID PRE-OP / PRE-PROCEDURE SCREENING ORDER (NO ISOLATION) - Swab, Nasopharynx [[id number]]  (Normal) Collected: 08/13/22 3942    Lab  Status: Final result Specimen: Swab from Nasopharynx Updated: 08/14/22 0032    Narrative:      The following orders were created for panel order COVID PRE-OP / PRE-PROCEDURE SCREENING ORDER (NO ISOLATION) - Swab, Nasopharynx.  Procedure                               Abnormality         Status                     ---------                               -----------         ------                     Respiratory Panel PCR w/...[[id number]]  Normal              Final result                 Please view results for these tests on the individual orders.    Respiratory Panel PCR w/COVID-19(SARS-CoV-2) KELY/EDI/TERI/PAD/COR/MAD/MONTY In-House, NP Swab in UTM/VTM, 3-4 HR TAT - Swab, Nasopharynx [[id number]]  (Normal) Collected: 08/13/22 9924    Lab Status: Final result Specimen: Swab from Nasopharynx Updated: 08/14/22 0032     ADENOVIRUS, PCR Not Detected     Coronavirus 229E Not Detected     Coronavirus HKU1 Not Detected     Coronavirus NL63 Not Detected     Coronavirus OC43 Not Detected     COVID19 Not Detected     Human Metapneumovirus Not Detected     Human Rhinovirus/Enterovirus Not Detected     Influenza A PCR Not Detected     Influenza B PCR Not Detected     Parainfluenza Virus 1 Not Detected     Parainfluenza Virus 2 Not Detected     Parainfluenza Virus 3 Not Detected     Parainfluenza Virus 4 Not Detected     RSV, PCR Not Detected     Bordetella pertussis pcr Not Detected     Bordetella parapertussis PCR Not Detected     Chlamydophila pneumoniae PCR Not Detected     Mycoplasma pneumo by PCR Not Detected    Narrative:      In the setting of a positive respiratory panel with a viral infection PLUS a negative procalcitonin without other underlying concern for bacterial infection, consider observing off antibiotics or discontinuation of antibiotics and continue supportive care. If the respiratory panel is positive for atypical bacterial infection (Bordetella pertussis, Chlamydophila pneumoniae, or Mycoplasma pneumoniae),  consider antibiotic de-escalation to target atypical bacterial infection.          CT Chest Without Contrast Diagnostic    Result Date: 8/15/2022  CT CHEST WO CONTRAST DIAGNOSTIC-  Date of Exam: 8/15/2022 2:04 PM  Indication: Lung nodule, > 8mm.  Comparison: CT 08/13/2022  Technique: Serial and axial CT images of the chest were obtained. Reconstructions in the coronal and sagittal planes were performed. Automated exposure control and iterative reconstruction methods were used.  FINDINGS:  Hilum and Mediastinum: There is a conglomerate of lymph node seen within the right paratracheal region measuring up to approximately 6.8 x 4.4 cm (series 2 image 30). Evaluation is limited due to lack of intravenous contrast. Multiple small nodes are present within the prevascular region as well as the superior mediastinum. No definite left hilar adenopathy identified. Normal heart size. No pericardial effusion.  Unremarkable thoracic aorta and pulmonary arteries. Atherosclerotic calcifications are present. Granulomatous calcifications are present.  Lung Parenchyma and Pleura: There is a mass present within the right upper lobe measuring up to 5.4 x 3.0 cm (series 4 image 26). A contiguous satellite nodules present laterally measuring up to 2.1 cm (series 4 image 25). The mass appears to extend to the right suprahilar region measuring proxy 6.8 cm in oblique dimension (series 900 image 30). There is a small subpleural cavitary nodule present within the anterior left lower lobe measuring up to 2.4 x 1.3 cm (series 4 image 59). A tiny subpleural nodules present within the left upper lobe measuring up to 6 mm (series 4 image 24). An additional subpleural cavitary lesion is present within the left upper lobe measuring up to 1.9 cm (series 4 image 39). A subpleural pulmonary nodule is present within the left upper lobe measuring up to 7 mm (series 4 image 54). No focal consolidations. No pleural effusions. No additional pulmonary nodule  or mass identified. Mild atelectatic changes and nodularity is present within the subpleural right lower lobe with no evidence of additional dominant nodule which may be related to subsegmental atelectasis.  Upper Abdomen: No acute process identified. The adrenal glands appear unremarkable.  Soft tissues: Unremarkable.  Osseous structures: No aggressive focal lytic or sclerotic osseous lesions. Degenerative changes are present within the spine.      Impression:  1. Large right upper lobe mass with contiguous satellite lesion extending to the right suprahilar region with large conglomerate of right paratracheal nodes likely representing primary malignancy and local metastatic disease. 2. Small cavitary lesions present within the left upper lobe and the left lower lobe which are concerning for second primary malignancies. Given the constellation of findings, an atypical infectious process is also in the differential though less likely particularly given the masslike right paratracheal adenopathy. PET/CT evaluation may be helpful of these findings are related to previous scarring. If hypermetabolic, this would be equivocal for infectious/inflammatory process or malignancy. 3. Multiple small subcentimeter pulmonary nodules as above which are indeterminate.     This report was finalized on 8/15/2022 2:40 PM by Rosalee Trevino MD.      MRI Brain With & Without Contrast    Result Date: 8/16/2022  DATE OF EXAM: 8/15/2022 10:09 PM  PROCEDURE: MRI BRAIN W WO CONTRAST-  INDICATIONS: Brain/CNS neoplasm, monitor  COMPARISON: No comparisons available.  TECHNIQUE: Multiplanar multisequence images of the brain were performed prior to and after the uneventful intravenous contrast administration of 16 mL MultiHance.  FINDINGS: No acute infarct is present on diffusion weighted sequences. Midline structures are unremarkable and the craniocervical junction is satisfactory in appearance. Age-related changes are evident with some mild  generalized volume loss and typical T2 hyperintense white matter changes of chronic small vessel ischemia. There is otherwise no evidence of intracranial hemorrhage, mass or mass effect. There is no abnormal brain parenchymal enhancement. The ventricles are normal in size and configuration. The orbits are unremarkable and the paranasal sinuses are grossly clear.      Impression: No evidence of brain parenchymal metastatic involvement.  Age-related changes are present as above. There is otherwise no evidence of hemorrhage, mass or abnormal enhancement.  This report was finalized on 8/16/2022 8:17 AM by Hira Josue.      XR Chest 1 View    Result Date: 8/16/2022  DATE OF EXAM: 8/16/2022 12:28 PM  PROCEDURE: XR CHEST 1 VW-  INDICATIONS: post procedure; R91.8-Other nonspecific abnormal finding of lung field   COMPARISON: CT chest 8/15/2022  TECHNIQUE: Single radiographic view of the chest was obtained.  FINDINGS: A vascular stent is seen to the right of the mediastinum. Ill-defined right upper lobe/paramediastinal opacity is present, may represent combination patient's mediastinal mass and right upper lobe mass, which are seen to better advantage on the CT chest from 8/15/2022. No pneumothorax is seen. There is no pleural effusion. Left lung is clear. Heart size is normal. No suspicious osseous abnormalities.      Impression:  1. Vascular stent material is seen in the right side of the mid upper mediastinum, presumably representing an SVC stent. 2. Ill-defined opacity in the paramediastinal right upper lobe, thought to represent accommodation patient's known right upper lobe lung mass and right-sided mediastinal mass, seen to better advantage on prior CT chest. 3. No visible pneumothorax.  This report was finalized on 8/16/2022 12:49 PM by Demetra Ramachandran MD.      FL C Arm During Surgery    Result Date: 8/16/2022  DATE OF EXAM: 8/16/2022 10:50 AM  PROCEDURE: FL C ARM DURING SURGERY-  INDICATIONS: BRONCHOSCOPY WITH  ENDOBRONCHIAL ULTRASOUND WITH NAVIGATION; R91.8-Other nonspecific abnormal finding of lung field  COMPARISON: No comparisons available.    FINDINGS: 2 minutes 49 seconds fluoroscopy time was utilized during bronchoscopic navigation.. No images were obtained.       Impression: 2 minutes 49 seconds fluoroscopy during bronchoscopic navigation procedure. Please refer to the procedure report for findings and recommendations.  This report was finalized on 8/16/2022 1:20 PM by Demetra Ramachandran MD.      IR Vein Stent Placement    Result Date: 8/15/2022  IR VEIN STENT PLACEMENT-                                                         History: SVC syndrome. Bulky mediastinal lymphadenopathy with a lung mass.    : John Pradhan M.D.                                                                            Modality: Sonography and fluoroscopy.                                 DOSE REDUCTION: The examination was performed according to departmental dose-optimization program.  Fluoro time: 5.1 minutes.  Radiation dose: 47.5 mGy air Kerma.                                                                              SEDATION: Moderate sedation was administered. 2 milligram of Versed and 100 micrograms of fentanyl IV was used for moderate sedation monitored under my direction. Total intra service time of sedation was 30 minutes. The patient's vital signs were monitored throughout the procedure and recorded in the patient's medical record by the nurse.  Anesthesia: Lidocaine, local infiltration    Medicines: Not applicable  Contrast medium: Isovue 300, 25 cc.  Estimated blood loss:  < 5 cc.          Technique:  A thorough discussion of the risks, benefits, and alternatives of the procedure, and if applicable, moderate sedation, was carried out with the patient. They were encouraged to ask any questions. Any questions were answered. They verbalized understanding. A written informed consent was then signed.   A  multi-component timeout was performed prior to starting the procedure using the departmental protocol.  The procedure room personnel used personal protective equipment. The operators used sterile gloves and if indicated, sterile gowns. The surgical site was prepped with chlorhexidine gluconate  and draped in the maximal applicable sterile fashion.  The patient was laid supine on the procedure table. The right common femoral vein access site was prepped with chlorhexidine gluconate and draped in the standard sterile fashion.  The right common femoral vein was localized using anatomic and ultrasonographic landmarks. The vein is patent. Using aseptic precautions, real-time ultrasonographic guidance, after local anesthesia and dermatotomy, the vein was accessed with an access needle. Eventually a guide wire was placed using standard exchanges. Over the wire an 8 [place] Long vascular sheath was placed.  The combination of a Bentson wire and the sheath was used to advance the guidewire across the area SVC stenosis without difficulty. Over the wire, the sheath with the dilator was advanced to the most proximal segment of the right innominate vein. The wire was exchanged for a superstiff exchange length Amplatz wire. The sheath was used to perform an SVC gram. There was severe stenosis of the SVC measuring approximately 4 cm in length starting at or just beyond the left innominate vein junction. The central segment of the left innominate vein was also severely stenotic. 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 There was dilatation of the caudal aspect of the right internal jugular vein. There was filling of upstream collaterals from the jugular vein.  Over the wire, a 10 mm x 60 mm self-expanding stent was positioned across the stenosis extending from the caudal most aspect of the right innominate vein to the mid SVC covering the area of stenosis with an extension beyond the  stenosis of approximately 1.5 cm each proximally and distally. The stent was successfully deployed and then dilated with a 10 mm diameter balloon successfully.  A final angiogram in 2 different obliquities from the catheter positioned in the most peripheral right innominate vein showed brisk flow across the SVC with decompression of the subclavian and internal jugular veins and resolution of the jugular collaterals. There was no extravasation.  The catheter and sheath were withdrawn and hemostasis was secured by manual compression. An aseptic dressing was applied.  The patient was transferred to the recovery area and discharged from the department in stable condition.  Complications: None immediate.     Findings: Described above.  Device: Epic self-expanding 6 [place]orean 10 mm x 60 mm bare-metal stent.      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 There was restoration of SVC luminal patency with brisk flow, resolution of upstream collaterals and no extravasation.  The patient reported improvement in his SVC syndrome symptoms immediately after the SVC recanalization.  Thank you for the opportunity to assist in the care of your patient.  This report was finalized on 8/15/2022 3:51 PM by John Pradhan MD.            I have reviewed the medications:  Scheduled Meds:nicotine, 1 patch, Transdermal, Q24H  pantoprazole, 40 mg, Oral, Q AM  senna-docusate sodium, 2 tablet, Oral, BID  sodium chloride, 10 mL, Intravenous, Q12H  thiamine, 100 mg, Oral, Daily      Continuous Infusions:heparin, 28 Units/kg/hr, Last Rate: 28 Units/kg/hr (08/17/22 0049)  lactated ringers, 9 mL/hr, Last Rate: 9 mL/hr (08/16/22 1041)  Pharmacy to Dose Heparin,       PRN Meds:.•  acetaminophen **OR**  acetaminophen **OR** acetaminophen  •  senna-docusate sodium **AND** polyethylene glycol **AND** bisacodyl **AND** bisacodyl  •  LORazepam **OR** diazePAM **OR** LORazepam **OR** diazePAM **OR** diazePAM  •  HYDROcodone-acetaminophen  •  melatonin  •  Morphine  •  ondansetron  •  Pharmacy to Dose Heparin  •  sodium chloride    Assessment & Plan   Assessment & Plan     Active Hospital Problems    Diagnosis  POA   • **Pulmonary mass [R91.8]  Yes   • Jugular vein thrombosis [I82.890]  Yes   • SVC syndrome [I87.1]  Yes   • Renal mass [N28.89]  Yes   • High blood pressure [I10]  Yes   • Marijuana use [F12.90]  Yes   • Tobacco dependence [F17.200]  Yes   • Alcohol use [Z72.89]  Yes      Resolved Hospital Problems   No resolved problems to display.        Brief Hospital Course to date:  Mitesh Molina is a 57 y.o. male with PMH of HTN, tobacco/THC use, ETOH abuse transferred from [facility] for SVC syndrome. Imaging there showed right renal mass, RUL pulmonary mass, numerous pulm nodules. And mediastinal adenopathy. CT neck shoed thrombus in both jugular veins, left greater than right with superior vena cava occlusion.      SVC syndrome w/Jugular Vein Thrombosis  -He is is status post stent placement by IR 8/15/2022  -Currently on heparin gtt., transition to NOAC once evaluated by urology  -Continue pain control     Large RUL Mass - Likely Lung Primary  -Pulmonary following, s/p bronchoscopy with biopsy 8/16/2022, discussed with Dr. Larson, follow-up pathology  -Oncology following, seen by Dr. Hinojosa,  if small cell, may go ahead and start chemotherapy while inpatient  - Rad Onc consulted, likely palliative radiation to begin in [place] where he lives  - MRI brain with no evidence of metastatic disease     Right Renal Mass:  -possibly a secondary malignancy, renal cell carcinoma  -We will have urology consulted.    Elevated Blood Pressure   -BP currently stable, was likely pain driven, not on any  antihypertensives     Tobacco / Marijuana dependence  - nicotine patch     Alcohol use  - denies h/o withdrawal, drinking 6 pack of beer on average twice weekly  - CIWA protocol w/ PRN oral ativan/IV valium  - thiamine / folic acid / MVI  - seizure precautions    Addendum:  -Discussed with urology,, plan to get CT abdomen pelvis with and without contrast for better imaging, no plan for any immediate surgical intervention, surgery will stop heparin gtt and start Eliquis.  Further plans/recommendations to follow after imaging has been reviewed.    Expected Discharge Location and Transportation: Home  Expected Discharge Date: 8/18    DVT prophylaxis:  Medical and mechanical DVT prophylaxis orders are present.     AM-PAC 6 Clicks Score (PT): 22 (08/16/22 2000)    CODE STATUS:   Code Status and Medical Interventions:   Ordered at: 08/14/22 0011     Code Status (Patient has no pulse and is not breathing):    CPR (Attempt to Resuscitate)     Medical Interventions (Patient has pulse or is breathing):    Full Support       Roger Escobar MD  08/17/22

## 2022-08-17 NOTE — PLAN OF CARE
Goal Outcome Evaluation:   Pt alert and oriented x4, vs stable, and pt has no complaints of pain at this time. Room air. CT scan complete and pt discharging home. Will continue to monitor.
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
## 2022-08-17 NOTE — DISCHARGE SUMMARY
[facility] Medicine Services  DISCHARGE SUMMARY    Patient Name: Mitesh Molina  : 1965  MRN: [id number]    Date of Admission: 2022 10:17 PM  Date of Discharge:  2022  Primary Care Physician: Christophe Garcia APRN    Consults     Date and Time Order Name Status Description    2022  8:48 AM Inpatient Urology Consult      8/15/2022 11:10 AM Inpatient Pulmonology Consult Completed     8/15/2022 12:33 AM Inpatient Radiation Oncology Consult Completed     2022 12:11 AM Inpatient Diagnostic Radiology Consult      2022 12:11 AM Inpatient Hematology & Oncology Consult Completed           Hospital Course     Presenting Problem:   SVC syndrome [I87.1]    Active Hospital Problems    Diagnosis  POA   • **Pulmonary mass [R91.8]  Yes   • Jugular vein thrombosis [I82.890]  Yes   • SVC syndrome [I87.1]  Yes   • Renal mass [N28.89]  Yes   • High blood pressure [I10]  Yes   • Marijuana use [F12.90]  Yes   • Tobacco dependence [F17.200]  Yes   • Alcohol use [Z72.89]  Yes      Resolved Hospital Problems   No resolved problems to display.      Hospital Course:  Mitesh Molina is a 57 y.o. male with PMH of HTN, tobacco/THC use, ETOH abuse transferred from [facility] for SVC syndrome. Imaging there showed right renal mass, RUL pulmonary mass, numerous pulm nodules. And mediastinal adenopathy. CT neck shoed thrombus in both jugular veins, left greater than right with superior vena cava occlusion.      SVC syndrome w/Jugular Vein Thrombosis  -He is is status post stent placement by IR 8/15/2022  -s/p heparin gtt., transitioned to Eliquis which he will continue at discharge.      Large RUL Mass - Likely Lung Primary  -Pulmonary was consulted,he is s/p bronchoscopy with biopsy 2022, by Dr. Larson, pathology pending (Dr. Larson to follow)  -Oncology following, seen by Dr. Hinojosa, recommends follow-up with outpatient oncology ([facility] oncology) in [place]  -  Rad Onc consulted, likely palliative radiation to begin in [place] where he lives with Dr. Dotson  - MRI brain showed no evidence of metastatic disease     Right Renal Mass:  -possibly a secondary malignancy, renal cell carcinoma  -Discussed with urology, , CT abd/pelvis with and without contrast was ordered for better imaging, likely has RCC (final report pending) however no plan for any immediate surgical intervention at this time, he will f/u with outpatient urology Dr. Freitas as outpatient in [place]     Elevated Blood Pressure   -BP currently stable, was likely pain driven, not on any antihypertensives     Tobacco / Marijuana dependence  -Extensively counseled on cessation,  NRT was provided      Alcohol use  - denies h/o withdrawal, drinking 6 pack of beer on average twice weekly  - s/p CIWA protocol w/ PRN oral ativan/IV valium  - thiamine / folic acid / MVI     Discharge Follow Up Recommendations for outpatient labs/diagnostics:  Follow-up with radiation oncology and hematology oncology in [place]  Follow-up with PCP in 1 week    Day of Discharge     HPI: Follow-up SVC syndrome, lung mass     HPI: No acute events overnight, patient said he rested well, has no new complaints     ROS:  Gen- No fevers, chills  CV- No chest pain, palpitations  Resp- No cough, dyspnea  GI- No N/V/D, abd pain     Vital Signs:   Temp:  [97.6 °F (36.4 °C)-98.7 °F (37.1 °C)] 98.5 °F (36.9 °C)  Heart Rate:  [] 109  Resp:  [16-18] 18  BP: (118-146)/(78-99) 141/91      Physical Exam:  Constitutional: No acute distress, awake, alert  HENT: NCAT, mucous membranes moist  Respiratory: Clear to auscultation bilaterally, respiratory effort normal   Cardiovascular: RRR, no murmurs, rubs, or gallops  Gastrointestinal: Positive bowel sounds, soft, nontender, nondistended  Musculoskeletal: No bilateral ankle edema  Psychiatric: Appropriate affect, cooperative  Neurologic: Oriented x 3, nonfocal  Skin: No rashes    Pertinent  and/or  Most Recent Results     LAB RESULTS:      Lab 08/17/22  0628 08/16/22  2311 08/16/22  0700 08/15/22  1805 08/15/22  0938 08/15/22  0227 08/14/22  1643 08/14/22 0457 08/13/22 2310   WBC  --  7.12 6.16  --   --  6.04  --  6.29 6.23   HEMOGLOBIN  --  12.8* 13.3  --   --  13.1  --  13.3 13.5   HEMATOCRIT  --  38.0 39.7  --   --  39.0  --  39.6 40.4   PLATELETS  --  177 171  --   --  178  --  183 178   NEUTROS ABS  --  5.23 4.13  --   --  3.74  --  4.11 3.94   IMMATURE GRANS (ABS)  --  0.01 0.02  --   --  0.02  --  0.02 0.01   LYMPHS ABS  --  1.11 1.16  --   --  1.53  --  1.39 1.53   MONOS ABS  --  0.76 0.71  --   --  0.61  --  0.64 0.65   EOS ABS  --  0.00 0.11  --   --  0.11  --  0.10 0.08   MCV  --  96.0 95.4  --   --  96.1  --  97.3* 96.9   PROCALCITONIN  --   --   --   --   --   --   --   --  0.04   LACTATE  --   --   --   --   --   --   --   --  0.9   PROTIME  --   --   --   --   --   --   --   --  13.1   APTT  --   --   --   --   --   --   --  37.5*  --    HEPARIN ANTI-XA 0.40 0.32  --  0.10* 0.26* 0.34   < > 0.10*  --     < > = values in this interval not displayed.         Lab 08/16/22  0700 08/15/22  0227 08/14/22  0457 08/13/22  2310   SODIUM 138 138 137 139   POTASSIUM 4.1 4.0 3.8 3.8   CHLORIDE 103 104 104 104   CO2 23.0 23.0 23.0 26.0   ANION GAP 12.0 11.0 10.0 9.0   BUN 10 9 7 5*   CREATININE 0.74* 0.66* 0.58* 0.59*   EGFR 105.7 109.4 113.8 113.2   GLUCOSE 104* 110* 98 91   CALCIUM 9.9 9.5 9.5 9.9   MAGNESIUM 2.0 2.0 2.1  --          Lab 08/15/22  0227 08/13/22  2310   TOTAL PROTEIN 7.4 7.5   ALBUMIN 3.50 3.60   GLOBULIN 3.9 3.9   ALT (SGPT) 6 6   AST (SGOT) 8 9   BILIRUBIN 0.2 0.3   ALK PHOS 69 73         Lab 08/13/22  2310   PROTIME 13.1   INR 1.00             Lab 08/14/22  0457 08/13/22  2310   ABO TYPING O O   RH TYPING Negative Negative   ANTIBODY SCREEN  --  Negative         Brief Urine Lab Results     None        Microbiology Results (last 10 days)     Procedure Component Value - Date/Time    AFB  Culture - Lavage, Lung, Right Upper Lobe [[id number]] Collected: 08/16/22 1155    Lab Status: Preliminary result Specimen: Lavage from Lung, Right Upper Lobe Updated: 08/17/22 1238     AFB Stain No acid fast bacilli seen on concentrated smear    Respiratory Culture - Lavage, Lung, Right Upper Lobe [[id number]] Collected: 08/16/22 1155    Lab Status: Preliminary result Specimen: Lavage from Lung, Right Upper Lobe Updated: 08/16/22 1427     Gram Stain Few (2+) Red blood cells      Rare (1+) WBCs seen      No organisms seen    COVID PRE-OP / PRE-PROCEDURE SCREENING ORDER (NO ISOLATION) - Swab, Nasopharynx [[id number]]  (Normal) Collected: 08/13/22 2334    Lab Status: Final result Specimen: Swab from Nasopharynx Updated: 08/14/22 0032    Narrative:      The following orders were created for panel order COVID PRE-OP / PRE-PROCEDURE SCREENING ORDER (NO ISOLATION) - Swab, Nasopharynx.  Procedure                               Abnormality         Status                     ---------                               -----------         ------                     Respiratory Panel PCR w/...[[id number]]  Normal              Final result                 Please view results for these tests on the individual orders.    Respiratory Panel PCR w/COVID-19(SARS-CoV-2) K[facility]I/TERI/PAD/COR/[facility] In-House, NP Swab in UTM/VTM, 3-4 HR TAT - Swab, Nasopharynx [[id number]]  (Normal) Collected: 08/13/22 2334    Lab Status: Final result Specimen: Swab from Nasopharynx Updated: 08/14/22 0032     ADENOVIRUS, PCR Not Detected     Coronavirus 229E Not Detected     Coronavirus HKU1 Not Detected     Coronavirus NL63 Not Detected     Coronavirus OC43 Not Detected     COVID19 Not Detected     Human Metapneumovirus Not Detected     Human Rhinovirus/Enterovirus Not Detected     Influenza A PCR Not Detected     Influenza B PCR Not Detected     Parainfluenza Virus 1 Not Detected     Parainfluenza Virus 2 Not Detected     Parainfluenza Virus 3 Not Detected      Parainfluenza Virus 4 Not Detected     RSV, PCR Not Detected     Bordetella pertussis pcr Not Detected     Bordetella parapertussis PCR Not Detected     Chlamydophila pneumoniae PCR Not Detected     Mycoplasma pneumo by PCR Not Detected    Narrative:      In the setting of a positive respiratory panel with a viral infection PLUS a negative procalcitonin without other underlying concern for bacterial infection, consider observing off antibiotics or discontinuation of antibiotics and continue supportive care. If the respiratory panel is positive for atypical bacterial infection (Bordetella pertussis, Chlamydophila pneumoniae, or Mycoplasma pneumoniae), consider antibiotic de-escalation to target atypical bacterial infection.    Blood Culture - Blood, Arm, Right [[id number]]  (Normal) Collected: 08/13/22 2310    Lab Status: Preliminary result Specimen: Blood from Arm, Right Updated: 08/17/22 0703     Blood Culture No growth at 3 days    Blood Culture - Blood, Arm, Left [[id number]]  (Normal) Collected: 08/13/22 2310    Lab Status: Preliminary result Specimen: Blood from Arm, Left Updated: 08/17/22 0704     Blood Culture No growth at 3 days          CT Chest Without Contrast Diagnostic    Result Date: 8/15/2022  CT CHEST WO CONTRAST DIAGNOSTIC-  Date of Exam: 8/15/2022 2:04 PM  Indication: Lung nodule, > 8mm.  Comparison: CT 08/13/2022  Technique: Serial and axial CT images of the chest were obtained. Reconstructions in the coronal and sagittal planes were performed. Automated exposure control and iterative reconstruction methods were used.  FINDINGS:  Hilum and Mediastinum: There is a conglomerate of lymph node seen within the right paratracheal region measuring up to approximately 6.8 x 4.4 cm (series 2 image 30). Evaluation is limited due to lack of intravenous contrast. Multiple small nodes are present within the prevascular region as well as the superior mediastinum. No definite left hilar adenopathy identified.  Normal heart size. No pericardial effusion.  Unremarkable thoracic aorta and pulmonary arteries. Atherosclerotic calcifications are present. Granulomatous calcifications are present.  Lung Parenchyma and Pleura: There is a mass present within the right upper lobe measuring up to 5.4 x 3.0 cm (series 4 image 26). A contiguous satellite nodules present laterally measuring up to 2.1 cm (series 4 image 25). The mass appears to extend to the right suprahilar region measuring proxy 6.8 cm in oblique dimension (series 900 image 30). There is a small subpleural cavitary nodule present within the anterior left lower lobe measuring up to 2.4 x 1.3 cm (series 4 image 59). A tiny subpleural nodules present within the left upper lobe measuring up to 6 mm (series 4 image 24). An additional subpleural cavitary lesion is present within the left upper lobe measuring up to 1.9 cm (series 4 image 39). A subpleural pulmonary nodule is present within the left upper lobe measuring up to 7 mm (series 4 image 54). No focal consolidations. No pleural effusions. No additional pulmonary nodule or mass identified. Mild atelectatic changes and nodularity is present within the subpleural right lower lobe with no evidence of additional dominant nodule which may be related to subsegmental atelectasis.  Upper Abdomen: No acute process identified. The adrenal glands appear unremarkable.  Soft tissues: Unremarkable.  Osseous structures: No aggressive focal lytic or sclerotic osseous lesions. Degenerative changes are present within the spine.       1. Large right upper lobe mass with contiguous satellite lesion extending to the right suprahilar region with large conglomerate of right paratracheal nodes likely representing primary malignancy and local metastatic disease. 2. Small cavitary lesions present within the left upper lobe and the left lower lobe which are concerning for second primary malignancies. Given the constellation of findings, an  atypical infectious process is also in the differential though less likely particularly given the masslike right paratracheal adenopathy. PET/CT evaluation may be helpful of these findings are related to previous scarring. If hypermetabolic, this would be equivocal for infectious/inflammatory process or malignancy. 3. Multiple small subcentimeter pulmonary nodules as above which are indeterminate.     This report was finalized on 8/15/2022 2:40 PM by Rosalee Trevino MD.      MRI Brain With & Without Contrast    Result Date: 8/16/2022  DATE OF EXAM: 8/15/2022 10:09 PM  PROCEDURE: MRI BRAIN W WO CONTRAST-  INDICATIONS: Brain/CNS neoplasm, monitor  COMPARISON: No comparisons available.  TECHNIQUE: Multiplanar multisequence images of the brain were performed prior to and after the uneventful intravenous contrast administration of 16 mL MultiHance.  FINDINGS: No acute infarct is present on diffusion weighted sequences. Midline structures are unremarkable and the craniocervical junction is satisfactory in appearance. Age-related changes are evident with some mild generalized volume loss and typical T2 hyperintense white matter changes of chronic small vessel ischemia. There is otherwise no evidence of intracranial hemorrhage, mass or mass effect. There is no abnormal brain parenchymal enhancement. The ventricles are normal in size and configuration. The orbits are unremarkable and the paranasal sinuses are grossly clear.      No evidence of brain parenchymal metastatic involvement.  Age-related changes are present as above. There is otherwise no evidence of hemorrhage, mass or abnormal enhancement.  This report was finalized on 8/16/2022 8:17 AM by Hira Josue.      XR Chest 1 View    Result Date: 8/16/2022  DATE OF EXAM: 8/16/2022 12:28 PM  PROCEDURE: XR CHEST 1 VW-  INDICATIONS: post procedure; R91.8-Other nonspecific abnormal finding of lung field   COMPARISON: CT chest 8/15/2022  TECHNIQUE: Single radiographic view  of the chest was obtained.  FINDINGS: A vascular stent is seen to the right of the mediastinum. Ill-defined right upper lobe/paramediastinal opacity is present, may represent combination patient's mediastinal mass and right upper lobe mass, which are seen to better advantage on the CT chest from 8/15/2022. No pneumothorax is seen. There is no pleural effusion. Left lung is clear. Heart size is normal. No suspicious osseous abnormalities.       1. Vascular stent material is seen in the right side of the mid upper mediastinum, presumably representing an SVC stent. 2. Ill-defined opacity in the paramediastinal right upper lobe, thought to represent accommodation patient's known right upper lobe lung mass and right-sided mediastinal mass, seen to better advantage on prior CT chest. 3. No visible pneumothorax.  This report was finalized on 8/16/2022 12:49 PM by Demetra Ramachandran MD.      FL C Arm During Surgery    Result Date: 8/16/2022  DATE OF EXAM: 8/16/2022 10:50 AM  PROCEDURE: FL C ARM DURING SURGERY-  INDICATIONS: BRONCHOSCOPY WITH ENDOBRONCHIAL ULTRASOUND WITH NAVIGATION; R91.8-Other nonspecific abnormal finding of lung field  COMPARISON: No comparisons available.    FINDINGS: 2 minutes 49 seconds fluoroscopy time was utilized during bronchoscopic navigation.. No images were obtained.       2 minutes 49 seconds fluoroscopy during bronchoscopic navigation procedure. Please refer to the procedure report for findings and recommendations.  This report was finalized on 8/16/2022 1:20 PM by Demetra Ramachandran MD.      IR Vein Stent Placement    Result Date: 8/15/2022  IR VEIN STENT PLACEMENT-                                                         History: SVC syndrome. Bulky mediastinal lymphadenopathy with a lung mass.    : John Pradhan M.D.                                                                            Modality: Sonography and fluoroscopy.                                 DOSE REDUCTION: The  examination was performed according to departmental dose-optimization program.  Fluoro time: 5.1 minutes.  Radiation dose: 47.5 mGy air Kerma.                                                                              SEDATION: Moderate sedation was administered. 2 milligram of Versed and 100 micrograms of fentanyl IV was used for moderate sedation monitored under my direction. Total intra service time of sedation was 30 minutes. The patient's vital signs were monitored throughout the procedure and recorded in the patient's medical record by the nurse.  Anesthesia: Lidocaine, local infiltration    Medicines: Not applicable  Contrast medium: Isovue 300, 25 cc.  Estimated blood loss:  < 5 cc.          Technique:  A thorough discussion of the risks, benefits, and alternatives of the procedure, and if applicable, moderate sedation, was carried out with the patient. They were encouraged to ask any questions. Any questions were answered. They verbalized understanding. A written informed consent was then signed.   A multi-component timeout was performed prior to starting the procedure using the departmental protocol.  The procedure room personnel used personal protective equipment. The operators used sterile gloves and if indicated, sterile gowns. The surgical site was prepped with chlorhexidine gluconate  and draped in the maximal applicable sterile fashion.  The patient was laid supine on the procedure table. The right common femoral vein access site was prepped with chlorhexidine gluconate and draped in the standard sterile fashion.  The right common femoral vein was localized using anatomic and ultrasonographic landmarks. The vein is patent. Using aseptic precautions, real-time ultrasonographic guidance, after local anesthesia and dermatotomy, the vein was accessed with an access needle. Eventually a guide wire was placed using standard exchanges. Over the wire an 8 [place] Long vascular sheath was placed.  The  combination of a Bentson wire and the sheath was used to advance the guidewire across the area SVC stenosis without difficulty. Over the wire, the sheath with the dilator was advanced to the most proximal segment of the right innominate vein. The wire was exchanged for a superstiff exchange length Amplatz wire. The sheath was used to perform an SVC gram. There was severe stenosis of the SVC measuring approximately 4 cm in length starting at or just beyond the left innominate vein junction. The central segment of the left innominate vein was also severely stenotic. 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 There was dilatation of the caudal aspect of the right internal jugular vein. There was filling of upstream collaterals from the jugular vein.  Over the wire, a 10 mm x 60 mm self-expanding stent was positioned across the stenosis extending from the caudal most aspect of the right innominate vein to the mid SVC covering the area of stenosis with an extension beyond the stenosis of approximately 1.5 cm each proximally and distally. The stent was successfully deployed and then dilated with a 10 mm diameter balloon successfully.  A final angiogram in 2 different obliquities from the catheter positioned in the most peripheral right innominate vein showed brisk flow across the SVC with decompression of the subclavian and internal jugular veins and resolution of the jugular collaterals. There was no extravasation.  The catheter and sheath were withdrawn and hemostasis was secured by manual compression. An aseptic dressing was applied.  The patient was transferred to the recovery area and discharged from the department in stable condition.  Complications: None immediate.     Findings: Described above.  Device: Epic self-expanding 6 [place] 10 mm x 60 mm bare-metal stent.      Impression:                                                              Malignant SVC syndrome with  severe SVC stenosis successfully treated with transfemoral venous SVC primary stenting and then angioplasty with a 10 mm x 60 mm stent described above and at 10 mm balloon angioplasty. There was restoration of SVC luminal patency with brisk flow, resolution of upstream collaterals and no extravasation.  The patient reported improvement in his SVC syndrome symptoms immediately after the SVC recanalization.  Thank you for the opportunity to assist in the care of your patient.  This report was finalized on 8/15/2022 3:51 PM by John Pradhan MD.      CT Outside Films    Result Date: 8/14/2022  This procedure was auto-finalized with no dictation required.    CT Outside Films    Result Date: 8/14/2022  This procedure was auto-finalized with no dictation required.    CT Outside Films    Result Date: 8/14/2022  This procedure was auto-finalized with no dictation required.        Plan for Follow-up of Pending Labs/Results: Outpatient oncology  Pending Labs     Order Current Status    Fungus Culture - Lavage, Lung, Right Upper Lobe In process    Fungus Smear - Lavage, Lung, Right Upper Lobe In process    NON-GYN CYTOLOGY, [facility] ([facility]) In process    Tissue Pathology Exam In process    Virus Culture - Lavage, Lung, Right Upper Lobe In process    AFB Culture - Lavage, Lung, Right Upper Lobe Preliminary result    Blood Culture - Blood, Arm, Left Preliminary result    Blood Culture - Blood, Arm, Right Preliminary result    Respiratory Culture - Lavage, Lung, Right Upper Lobe Preliminary result        Discharge Details        Discharge Medications      New Medications      Instructions Start Date   apixaban 5 MG tablet tablet  Commonly known as: ELIQUIS   10 mg, Oral, Every 12 Hours Scheduled      apixaban 5 MG tablet tablet  Commonly known as: ELIQUIS   5 mg, Oral, Every 12 Hours Scheduled   Start Date: August 24, 2022            Allergies   Allergen Reactions   • Codeine Itching     Discharge Disposition:  Home  Home or Self Care  Diet:  Hospital:  Diet Order   Procedures   • Diet Regular     Activity:as tolerated    Restrictions or Other Recommendations:  None       CODE STATUS:    Code Status and Medical Interventions:   Ordered at: 08/14/22 0011     Code Status (Patient has no pulse and is not breathing):    CPR (Attempt to Resuscitate)     Medical Interventions (Patient has pulse or is breathing):    Full Support       No future appointments.    Roger Escobar MD  08/17/22      Time Spent on Discharge:  I spent 35  minutes on this discharge activity which included: face-to-face encounter with the patient, reviewing the data in the system, coordination of the care with the nursing staff as well as consultants, documentation, and entering orders.

## 2022-08-17 NOTE — PROGRESS NOTES
HEPARIN INFUSION    Mitesh Molina is a 57 y.o. male receiving heparin infusion.    Therapy for (VTE/Cardiac): VTE  Patient Weight: 77.6 kg  Initial Bolus (Y/N): No (heparin drip started at OSH)  Any Bolus (Y/N): Yes     Signs or Symptoms of Bleeding: none noted    VTE (PE/DVT)   Initial Bolus: 80 units/kg (Max 10,000 units)  Initial rate: 18 units/kg/hr (Max 1,500 units/hr)    Anti Xa Rebolus Infusion Hold time Change infusion Dose   (Units/kg/hr) Next Anti Xa Level Due   < 0.11 50 Units/kg   (4000 Units Max) None  Increase by  4 Units/kg/hr 6 hours   0.11 - 0.19 25 Units/kg   (2000 Units Max) None  Increase by  3 Units/kg/hr 6 hours   0.2 - 0.29 0 None  Increase by  2 Units/kg/hr 6 hours   0.3 - 0.7 0 None  No Change 6 hours   (after 2 consecutive  levels in range check  qAM)   0.71 - 0.8 0 None  Decrease by  1 Units/kg/hr 6 hours   0.81 - 0.9 0 None Decrease by  2 Units/kg/hr 6 hours   0.91 - 1 0 60 Minutes  Decrease by  3 Units/kg/hr 6 hours   >1 0 Hold  After Anti Xa < 0.7   decrease old rate by  4 Units/kg/hr  Every 2 hours until Anti Xa < 0.7  then when infusion restarts in 6 hours     Results from last 7 days   Lab Units 08/16/22  2311 08/16/22  0700 08/15/22  0227 08/14/22  0457 08/13/22  2310   INR   --   --   --   --  1.00   HEMOGLOBIN g/dL 12.8* 13.3 13.1   < > 13.5   HEMATOCRIT % 38.0 39.7 39.0   < > 40.4   PLATELETS 10*3/mm3 177 171 178   < > 178    < > = values in this interval not displayed.       Date   Time   Anti-Xa Current Rate (Unit/kg/hr) Bolus   (Units) Rate Change   (Unit/kg/hr) New Rate (Unit/kg/hr) Next anti-Xa Comments  Pump Check Daily   8/13 2315 -- 0 -- +18 18 0500 Heparin drip/bolus started at OSH at ~19:40.  Heparin drip has been off for ~50 minutes.  Resume heparin drip and re-check anti-Xa in ~10 hours   8/14 0457 0.1 18 3800 +4 22 1400 D/w KEMI Shah   8/14 1643 0.1 22 3900 +4 26 0200 Spoke w/RN   8/15 0227 0.34 26 -- -- 26 0900 D/w RN   8/15 0938 0.26 26 -- +2 28 1500 DW RN    8/15 1805 0.1 off -- -- 28 -- Off since ~1200 for procedure; resumed at previous rate   8/16 0000 -- 28 -- -- OFF -- Off at Midnight per order   8/16 1600 -- off -- -- 28 2200 Ok to resume w/o bolus per Dr. Larson post bronch; d/w RN   8/16 2311 0.32 28 -- -- 28 0600 [address] [facility]   8/17 0628 0.4 28 -- -- 28 0600 D/w Evi Castro, PharmD, BCPS  8/17/2022  08:51 EDT

## 2022-08-17 NOTE — PROGRESS NOTES
Intensive Care Follow-up     Hospital:  LOS: 4 days   Mr. Mitesh Molina, 57 y.o. male is followed for:   Pulmonary mass            History of present illness:   57-year-old male with a past medical history significant for hypertension, tobacco abuse, alcohol abuse, and COPD.  He presented [facility] on 8/13/2022, with swelling of the right face.  CT scan of the chest performed at outside hospital showed a large right upper lobe mass that was 7.1 x 4.2 cm with a large mediastinal tumor causing obstruction of the SVC with extensive mediastinal adenopathy.  Pulmonary was consulted for possible biopsy.  Patient currently notes that he has significant pain on the right side of his chest and back has been present for the last few days.  The pain radiates all the way down to his low back to the top of his shoulder.  He has also noticed extensive swelling of the right side of his face/neck.  Denies any hemoptysis.  He is not requiring any oxygen.  Denies any nausea, vomiting, fever, or chills    Subjective   Interval History:  Patient feels much better after having stent placed.  Denies any chest pain, nausea, fever, or chills.  Speciation that he was feeling before is now resolved.  No other acute complaints this morning.             The patient's past medical, surgical and social history were reviewed and updated in Epic as appropriate.       Objective     Infusions:  heparin, 28 Units/kg/hr, Last Rate: 28 Units/kg/hr (08/17/22 0049)  lactated ringers, 9 mL/hr, Last Rate: 9 mL/hr (08/16/22 1041)  Pharmacy to Dose Heparin,       Medications:  nicotine, 1 patch, Transdermal, Q24H  pantoprazole, 40 mg, Oral, Q AM  senna-docusate sodium, 2 tablet, Oral, BID  sodium chloride, 10 mL, Intravenous, Q12H      I reviewed the patient's medications.    Vital Sign Min/Max for last 24 hours  Temp  Min: 97 °F (36.1 °C)  Max: 98.7 °F (37.1 °C)   BP  Min: 118/92  Max: 146/99   Pulse  Min: 95  Max: 118   Resp  Min: 16   Max: 20   SpO2  Min: 93 %  Max: 100 %   No data recorded       Input/Output for last 24 hour shift  08/16 0701 - 08/17 0700  In: 800 [I.V.:800]  Out: 20    S RR:  [7-18] 12  GENERAL : NAD, conversant  RESPIRATORY/THORAX : normal respiratory effort and no intercostal retractions, CTAB  CARDIOVASCULAR : Normal S1/S2, RRR.  No lower ext edema.  GASTROINTESTINAL : Soft, NT/ND. BS x 4 normoactive. No hepatosplenomegaly.  MUSCULOSKELETAL : No cyanosis, clubbing, or ischemia  NEUROLOGICAL: alert and oriented to person, place and time  PSYCHOLOGICAL : Appropriate affect      Results from last 7 days   Lab Units 08/16/22  2311 08/16/22  0700 08/15/22  0227   WBC 10*3/mm3 7.12 6.16 6.04   HEMOGLOBIN g/dL 12.8* 13.3 13.1   PLATELETS 10*3/mm3 177 171 178     Results from last 7 days   Lab Units 08/16/22  0700 08/15/22  0227 08/14/22  0457   SODIUM mmol/L 138 138 137   POTASSIUM mmol/L 4.1 4.0 3.8   CO2 mmol/L 23.0 23.0 23.0   BUN mg/dL 10 9 7   CREATININE mg/dL 0.74* 0.66* 0.58*   MAGNESIUM mg/dL 2.0 2.0 2.1   GLUCOSE mg/dL 104* 110* 98     Estimated Creatinine Clearance: 120.9 mL/min (A) (by C-G formula based on SCr of 0.74 mg/dL (L)).          I reviewed the patient's new clinical results.  I reviewed the patient's new imaging results/reports including actual images and agree with reports.       Imaging Results (Last 24 Hours)     Procedure Component Value Units Date/Time    FL C Arm During Surgery [[id number]] Collected: 08/16/22 1319     Updated: 08/16/22 1323    Narrative:      DATE OF EXAM: 8/16/2022 10:50 AM     PROCEDURE: FL C ARM DURING SURGERY-     INDICATIONS: BRONCHOSCOPY WITH ENDOBRONCHIAL ULTRASOUND WITH NAVIGATION;  R91.8-Other nonspecific abnormal finding of lung field     COMPARISON: No comparisons available.           FINDINGS:   2 minutes 49 seconds fluoroscopy time was utilized during bronchoscopic  navigation.. No images were obtained.         Impression:      2 minutes 49 seconds fluoroscopy during  bronchoscopic navigation  procedure. Please refer to the procedure report for findings and  recommendations.     This report was finalized on 8/16/2022 1:20 PM by Demetra Ramachandran MD.       XR Chest 1 View [[id number]] Collected: 08/16/22 1246     Updated: 08/16/22 1252    Narrative:      DATE OF EXAM:  8/16/2022 12:28 PM     PROCEDURE:  XR CHEST 1 VW-     INDICATIONS:  post procedure; R91.8-Other nonspecific abnormal finding of lung field       COMPARISON:  CT chest 8/15/2022      TECHNIQUE:   Single radiographic view of the chest was obtained.     FINDINGS:  A vascular stent is seen to the right of the mediastinum. Ill-defined  right upper lobe/paramediastinal opacity is present, may represent  combination patient's mediastinal mass and right upper lobe mass, which  are seen to better advantage on the CT chest from 8/15/2022. No  pneumothorax is seen. There is no pleural effusion. Left lung is clear.  Heart size is normal. No suspicious osseous abnormalities.       Impression:         1. Vascular stent material is seen in the right side of the mid upper  mediastinum, presumably representing an SVC stent.  2. Ill-defined opacity in the paramediastinal right upper lobe, thought  to represent accommodation patient's known right upper lobe lung mass  and right-sided mediastinal mass, seen to better advantage on prior CT  chest.  3. No visible pneumothorax.     This report was finalized on 8/16/2022 12:49 PM by Demetra Ramachandran MD.             Assessment & Plan   Impression        Pulmonary mass    SVC syndrome    Renal mass    High blood pressure    Marijuana use    Tobacco dependence    Alcohol use    Jugular vein thrombosis       Plan        57-year-old male with a past medical history significant for hypertension, tobacco abuse, alcohol abuse, and COPD.  Who presented [facility] for right upper lobe mass and SVC children.  Pulmonary was consulted for possible biopsy.  I reviewed the CT scan of the chest,  there is a 7.1 cm right upper lobe mass, with extensive adenopathy and a large right mediastinal mass.  Patient underwent a EBUS with navigational bronchoscopy on 8/16/2022.     -Follow-up final pathology results  -Continue nebs for possible COPD  -Continue pain control  -Plan for SVC stent this afternoon per IR  -Okay to switch heparin to NOAC from pulmonary standpoint  -If patient were to leave prior to having the biopsy results back then I will plan to have him a follow-up appointment made in the pulmonary clinic.  -Thank you for the consult, I will follow back up after pathology has resulted.    Plan of care and goals reviewed with multidisciplinary/antibiotic stewardship team during rounds.   I discussed the patient's findings and my recommendations with patient and nursing staff       Merly Larson,   Pulmonary, Critical care and Sleep Medicine

## 2022-08-17 NOTE — PLAN OF CARE
Goal Outcome Evaluation:  Plan of Care Reviewed With: patient           Outcome Evaluation: PT initial Eval completed.  Pt presents at baseline level of function and was independent with all functional mobility.  Independently ambulated 370ft with no AD and ascended/descended 10 steps without use of handrail.  Good stability throughout with no LOB.  Pt with no skilled IP PT needs at this time.  PT will sign off.  Rec home upon d/c.

## 2022-08-19 ENCOUNTER — TELEPHONE (OUTPATIENT)
Dept: PULMONOLOGY | Facility: CLINIC | Age: 57
End: 2022-08-19

## 2022-08-19 LAB
BACTERIA SPEC AEROBE CULT: NORMAL
BACTERIA SPEC AEROBE CULT: NORMAL

## 2022-08-19 NOTE — TELEPHONE ENCOUNTER
----- Message from Laci Larson DO sent at 8/18/2022  3:37 PM EDT -----  I called the patient to go over his results with him.  The 4R lymph node was positive for metastatic squamous cell carcinoma.  I suspect that the right upper lobe nodule even though was negative on biopsy is also the primary site.  I suspect that it was just missed during the biopsy process.  I did inform the patient of this this would make this a stage III cancer at minimal.  Treatment would mainly be chemo and radiation.  He already has a follow-up for August 29 at the cancer center in [place].  Informed that he needs to keep that appointment.  He does not need to come back and see me on October 4 unless he has breathing difficulties.  He verbalized understanding of this and agreed with the plan.

## 2022-08-20 LAB
BACTERIA SPEC RESP CULT: ABNORMAL
BACTERIA SPEC RESP CULT: ABNORMAL
GRAM STN SPEC: ABNORMAL

## 2022-08-24 LAB — VIRUS SPEC CULT: NORMAL

## 2022-09-06 LAB
QT INTERVAL: 362 MS
QTC INTERVAL: 471 MS

## 2022-09-27 LAB
FUNGUS WND CULT: NORMAL
MYCOBACTERIUM SPEC CULT: NORMAL
NIGHT BLUE STAIN TISS: NORMAL

## 2022-10-04 LAB — REF LAB TEST METHOD: NORMAL

## (undated) DEVICE — LUER-LOK 360°: Brand: CONNECTA, LUER-LOK

## (undated) DEVICE — TRAP,MUCUS SPECIMEN,40CC: Brand: MEDLINE

## (undated) DEVICE — ADAPT SWVL FIBROPTIC BRONCH

## (undated) DEVICE — BOWL UTIL STRL 32OZ

## (undated) DEVICE — KT PROC ENDOBRONC ILLUMISITE LOCAT/GUIDE EXT/WORK/CH 90DEG

## (undated) DEVICE — FRCP BIOP SUPERDIMENSION PREMARK

## (undated) DEVICE — NDL PULM SUPERDIMENSION ARCPOINT 21G

## (undated) DEVICE — Device: Brand: BALLOON

## (undated) DEVICE — BRSH CYTO INREACH SHEATH 1.8X2MM 130CM

## (undated) DEVICE — SINGLE USE BIOPSY VALVE MAJ-210: Brand: SINGLE USE BIOPSY VALVE (STERILE)

## (undated) DEVICE — Device: Brand: SINGLE USE ASPIRATION NEEDLE NA-U401SX

## (undated) DEVICE — ST EXT MICROBORE FIX M LL 38IN

## (undated) DEVICE — SINGLE USE SUCTION VALVE MAJ-209: Brand: SINGLE USE SUCTION VALVE (STERILE)